# Patient Record
Sex: MALE | Race: WHITE | NOT HISPANIC OR LATINO | Employment: FULL TIME | URBAN - METROPOLITAN AREA
[De-identification: names, ages, dates, MRNs, and addresses within clinical notes are randomized per-mention and may not be internally consistent; named-entity substitution may affect disease eponyms.]

---

## 2018-05-17 ENCOUNTER — OFFICE VISIT (OUTPATIENT)
Dept: CARDIOLOGY | Facility: CLINIC | Age: 28
End: 2018-05-17
Payer: COMMERCIAL

## 2018-05-17 VITALS
WEIGHT: 173.5 LBS | HEART RATE: 70 BPM | SYSTOLIC BLOOD PRESSURE: 141 MMHG | BODY MASS INDEX: 25.7 KG/M2 | DIASTOLIC BLOOD PRESSURE: 95 MMHG | HEIGHT: 69 IN

## 2018-05-17 DIAGNOSIS — R00.2 HEART PALPITATIONS: ICD-10-CM

## 2018-05-17 DIAGNOSIS — R00.0 SINUS TACHYCARDIA BY ELECTROCARDIOGRAM: ICD-10-CM

## 2018-05-17 PROCEDURE — 99204 OFFICE O/P NEW MOD 45 MIN: CPT | Mod: S$GLB,,, | Performed by: INTERNAL MEDICINE

## 2018-05-17 PROCEDURE — 99999 PR PBB SHADOW E&M-EST. PATIENT-LVL II: CPT | Mod: PBBFAC,,, | Performed by: INTERNAL MEDICINE

## 2018-05-17 NOTE — PROGRESS NOTES
Subjective:    Patient ID:  Augustine Webb is a 28 y.o. male who presents for evaluation of new pt (new pt) and er follow up      HPI 29 yo WM who went to ER on 5- with palpitations and SOB. Work up negative except for sinus tachycardia. Had a few drinks that day but no drugs or energy drinks. 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  Review of Systems   Constitution: Negative for decreased appetite, fever, weakness, malaise/fatigue, weight gain and weight loss.   HENT: Negative for hearing loss and nosebleeds.    Eyes: Negative for visual disturbance.   Cardiovascular: Positive for irregular heartbeat and palpitations. Negative for chest pain, claudication, cyanosis, dyspnea on exertion, leg swelling, near-syncope, orthopnea, paroxysmal nocturnal dyspnea and syncope.   Respiratory: Negative for cough, hemoptysis, shortness of breath, sleep disturbances due to breathing, snoring and wheezing.    Endocrine: Negative for cold intolerance, heat intolerance, polydipsia and polyuria.   Hematologic/Lymphatic: Negative for adenopathy and bleeding problem. Does not bruise/bleed easily.   Skin: Negative for color change, itching, poor wound healing, rash and suspicious lesions.   Musculoskeletal: Negative for arthritis, back pain, falls, joint pain, joint swelling, muscle cramps, muscle weakness and myalgias.   Gastrointestinal: Negative for bloating, abdominal pain, change in bowel habit, constipation, flatus, heartburn, hematemesis, hematochezia, hemorrhoids, jaundice, melena, nausea and vomiting.   Genitourinary: Negative for bladder incontinence, decreased libido, frequency, hematuria, hesitancy and urgency.   Neurological: Negative for brief paralysis, difficulty with concentration, excessive daytime sleepiness, dizziness, focal weakness, headaches, light-headedness, loss of balance, numbness and vertigo.   Psychiatric/Behavioral: Negative for altered mental status, depression and  "memory loss. The patient does not have insomnia and is not nervous/anxious.    Allergic/Immunologic: Negative for environmental allergies, hives and persistent infections.        Objective:    Physical Exam   Constitutional: He is oriented to person, place, and time. He appears well-developed and well-nourished. No distress.   BP (!) 141/95   Pulse 70   Ht 5' 8.5" (1.74 m)   Wt 78.7 kg (173 lb 8 oz)   BMI 26.00 kg/m²      HENT:   Head: Normocephalic and atraumatic.   Eyes: Conjunctivae and lids are normal. Pupils are equal, round, and reactive to light. Right eye exhibits no discharge. No scleral icterus.   Neck: Normal range of motion. Neck supple. No JVD present. No tracheal deviation present. No thyromegaly present.   Cardiovascular: Normal rate, regular rhythm, S1 normal, S2 normal, normal heart sounds and intact distal pulses.  Exam reveals no gallop and no friction rub.    No murmur heard.  Pulses:       Carotid pulses are 2+ on the right side, and 2+ on the left side.       Radial pulses are 2+ on the right side, and 2+ on the left side.        Femoral pulses are 2+ on the right side, and 2+ on the left side.       Popliteal pulses are 2+ on the right side, and 2+ on the left side.        Dorsalis pedis pulses are 2+ on the right side, and 2+ on the left side.        Posterior tibial pulses are 2+ on the right side, and 2+ on the left side.   Pulmonary/Chest: Effort normal and breath sounds normal. No respiratory distress. He has no wheezes. He has no rales. He exhibits no tenderness.   Abdominal: Soft. Bowel sounds are normal. He exhibits no distension and no mass. There is no hepatosplenomegaly or hepatomegaly. There is no tenderness. There is no rebound and no guarding.   Musculoskeletal: Normal range of motion. He exhibits no edema or tenderness.   Lymphadenopathy:     He has no cervical adenopathy.   Neurological: He is alert and oriented to person, place, and time. He has normal reflexes. No cranial " nerve deficit. Coordination normal.   Skin: Skin is warm and dry. No rash noted. He is not diaphoretic. No erythema. No pallor.   Psychiatric: He has a normal mood and affect. His speech is normal and behavior is normal. Judgment and thought content normal. Cognition and memory are normal.         Assessment:       1. Heart palpitations    2. Sinus tachycardia by electrocardiogram         Plan:     Young healthy male with limited risk factors   Patient advised to limit exposure to caffeine, stimulants, and alcohol  Orders Placed This Encounter   Procedures    Holter monitor - 48 hour    2D echo with color flow doppler     Follow-up in about 3 months (around 8/17/2018).

## 2018-05-30 ENCOUNTER — CLINICAL SUPPORT (OUTPATIENT)
Dept: CARDIOLOGY | Facility: CLINIC | Age: 28
End: 2018-05-30
Attending: INTERNAL MEDICINE
Payer: COMMERCIAL

## 2018-05-30 ENCOUNTER — TELEPHONE (OUTPATIENT)
Dept: CARDIOLOGY | Facility: CLINIC | Age: 28
End: 2018-05-30

## 2018-05-30 VITALS
DIASTOLIC BLOOD PRESSURE: 70 MMHG | SYSTOLIC BLOOD PRESSURE: 116 MMHG | WEIGHT: 173 LBS | HEART RATE: 61 BPM | HEIGHT: 68 IN | BODY MASS INDEX: 26.22 KG/M2

## 2018-05-30 DIAGNOSIS — R00.0 SINUS TACHYCARDIA BY ELECTROCARDIOGRAM: ICD-10-CM

## 2018-05-30 DIAGNOSIS — R00.2 HEART PALPITATIONS: ICD-10-CM

## 2018-05-30 PROCEDURE — 99999 PR PBB SHADOW E&M-EST. PATIENT-LVL II: CPT | Mod: PBBFAC,,,

## 2018-05-30 PROCEDURE — 93224 XTRNL ECG REC UP TO 48 HRS: CPT | Mod: S$GLB,,, | Performed by: INTERNAL MEDICINE

## 2018-05-30 PROCEDURE — 93306 TTE W/DOPPLER COMPLETE: CPT | Mod: S$GLB,,, | Performed by: INTERNAL MEDICINE

## 2018-05-30 NOTE — TELEPHONE ENCOUNTER
----- Message from Stefano Valencia sent at 5/30/2018  2:57 PM CDT -----  Contact: Pt  Name of Who is Calling: Augustine      What is the request in detail: Pt is calling requesting clearance so he can return to work.      Can the clinic reply by [facility]: no      What Number to Call Back if not in [facility]: [phone] (home)

## 2018-05-31 NOTE — TELEPHONE ENCOUNTER
The work excuse is waiting to be picked up by registration desk in [facility].

## 2018-06-01 LAB
ASCENDING AORTA: 0.03 CM
AV MEAN GRADIENT: 2.82 MMHG
AV PEAK GRADIENT: 5.18 MMHG
AV VALVE AREA: 3.22 CM2
BSA FOR ECHO PROCEDURE: 1.94 M2
CV ECHO LV RWT: 0.44 CM
DOP CALC AO PEAK VEL: 1.14 M/S
DOP CALC AO VTI: 23.21 CM
DOP CALC LVOT AREA: 4.37 CM2
DOP CALC LVOT DIAMETER: 2.36 CM
DOP CALC LVOT STROKE VOLUME: 74.76 CM3
DOP CALCLVOT PEAK VEL VTI: 17.1 CM
E WAVE DECELERATION TIME: 235.55 MSEC
E/A RATIO: 1.05
E/E' RATIO: -7.65
ECHO EF ESTIMATED: 60 %
ECHO LV POSTERIOR WALL: 1 CM (ref 0.6–1.1)
FRACTIONAL SHORTENING: 34 % (ref 28–44)
INTERVENTRICULAR SEPTUM: 1.06 CM (ref 0.6–1.1)
IVRT: 0.1 MSEC
LA MAJOR: 4.85 CM
LA MINOR: 4.76 CM
LA WIDTH: 3.26 CM
LEFT ATRIUM SIZE: 3.55 CM
LEFT ATRIUM VOLUME INDEX: 24.4 ML/M2
LEFT ATRIUM VOLUME: 47.26 CM3
LEFT INTERNAL DIMENSION IN SYSTOLE: 3.12 CM (ref 2.1–4)
LEFT VENTRICLE MASS INDEX: 88.9 G/M2
LEFT VENTRICULAR INTERNAL DIMENSION IN DIASTOLE: 4.72 CM (ref 3.5–6)
LEFT VENTRICULAR MASS: 172.42 G
LV LATERAL E/E' RATIO: -7.22
LV SEPTAL E/E' RATIO: -8.13
MV PEAK A VEL: 0.62 M/S
MV PEAK E VEL: 0.65 M/S
MV STENOSIS PRESSURE HALF TIME: 68.31 MS
MV VALVE AREA P 1/2 METHOD: 3.22 CM2
PISA TR MAX VEL: 2.47 M/S
PULM VEIN S/D RATIO: 0.83
PV PEAK D VEL: 0.59 M/S
PV PEAK S VEL: 0.49 M/S
RA MAJOR: 4.5 CM
RA PRESSURE: 3 MMHG
RA WIDTH: 3.59 CM
RIGHT VENTRICULAR END-DIASTOLIC DIMENSION: 4.35 CM
SINUS: 3.22 CM
STJ: 2.96 CM
TDI LATERAL: -0.09
TDI SEPTAL: -0.08
TDI: -0.09
TR MAX PG: 24.38 MMHG
TRICUSPID ANNULAR PLANE SYSTOLIC EXCURSION: 0.02 CM
TV REST PULMONARY ARTERY PRESSURE: 27.38 MMHG

## 2018-06-07 ENCOUNTER — TELEPHONE (OUTPATIENT)
Dept: CARDIOLOGY | Facility: CLINIC | Age: 28
End: 2018-06-07

## 2018-06-08 ENCOUNTER — TELEPHONE (OUTPATIENT)
Dept: CARDIOLOGY | Facility: CLINIC | Age: 28
End: 2018-06-08

## 2018-06-08 NOTE — TELEPHONE ENCOUNTER
Will get Dr Flores to write an excuse stating all test were clear.       ----- Message from Latanya March sent at 6/8/2018  9:56 AM CDT -----  Contact: wife Татьяна Webb  Wife states patient needs his return to work excuse to state his test were all clear in addition to him coming back to work.    Please call [phone]    Please call when ready for patient to

## 2018-06-11 ENCOUNTER — TELEPHONE (OUTPATIENT)
Dept: CARDIOLOGY | Facility: CLINIC | Age: 28
End: 2018-06-11

## 2018-06-11 NOTE — TELEPHONE ENCOUNTER
----- Message from Nichole Paez MA sent at 6/11/2018 11:06 AM CDT -----  Contact: same  Pt would like his test results. States he can not return to work until he his cleared.   ----- Message -----  From: Valerio Whitten  Sent: 6/11/2018  10:50 AM  To: Sandra OVERTON  Staff    Patient called in and stated he needs to get his test results ( ECHO & Holter Monitor) that was done on 5/30/18.  Patient dropped off monitor on 6/1/18.  Patient stated he cannot return to work until he gets results along with work release.    Patient call back number is [phone]

## 2018-06-11 NOTE — TELEPHONE ENCOUNTER
Called pt and left VM, work release is ready for pickup, all test normal.     ----- Message from Valerio Whitten sent at 6/11/2018 10:50 AM CDT -----  Contact: same  Patient called in and stated he needs to get his test results ( ECHO & Holter Monitor) that was done on 5/30/18.  Patient dropped off monitor on 6/1/18.  Patient stated he cannot return to work until he gets results along with work release.    Patient call back number is [phone]

## 2018-07-24 PROBLEM — F41.0 GENERALIZED ANXIETY DISORDER WITH PANIC ATTACKS: Status: ACTIVE | Noted: 2018-07-24

## 2018-07-24 PROBLEM — F41.1 GENERALIZED ANXIETY DISORDER WITH PANIC ATTACKS: Status: ACTIVE | Noted: 2018-07-24

## 2018-12-06 ENCOUNTER — OFFICE VISIT (OUTPATIENT)
Dept: URGENT CARE | Facility: CLINIC | Age: 28
End: 2018-12-06
Payer: COMMERCIAL

## 2018-12-06 VITALS
SYSTOLIC BLOOD PRESSURE: 130 MMHG | BODY MASS INDEX: 26.52 KG/M2 | TEMPERATURE: 98 F | HEART RATE: 80 BPM | HEIGHT: 68 IN | DIASTOLIC BLOOD PRESSURE: 86 MMHG | RESPIRATION RATE: 18 BRPM | OXYGEN SATURATION: 98 % | WEIGHT: 175 LBS

## 2018-12-06 DIAGNOSIS — M71.30 SYNOVIAL CYST: Primary | ICD-10-CM

## 2018-12-06 PROCEDURE — 99213 OFFICE O/P EST LOW 20 MIN: CPT | Mod: S$GLB,,, | Performed by: FAMILY MEDICINE

## 2018-12-06 NOTE — PROGRESS NOTES
"Subjective:       Patient ID: Augustine Webb is a 28 y.o. male.    Vitals:  height is 5' 8" (1.727 m) and weight is 79.4 kg (175 lb). His oral temperature is 97.7 °F (36.5 °C). His blood pressure is 130/86 and his pulse is 80. His respiration is 18 and oxygen saturation is 98%.     Chief Complaint: Abscess    Left thumb swollen for months      Abscess   Chronicity:  NewProgression Since Onset: gradually worsening  Location:  Finger  Associated Symptoms: no fever, no chills  Characteristics: redness, bruising and blistering    Treatments Tried:  Nothing  Relieved by:  Nothing  Worsened by:  Nothing   Patient has abstained a left thumb injury several months ago few months after he developed this cystic lesion at the radial surface dorsally along his left thumb at the IP joint.  It is painless.  He was hoping to have it removed today.    Constitution: Negative for chills, fatigue and fever.   HENT: Negative for congestion and sore throat.    Neck: Negative for painful lymph nodes.   Cardiovascular: Negative for chest pain and leg swelling.   Eyes: Negative for double vision and blurred vision.   Respiratory: Negative for cough and shortness of breath.    Gastrointestinal: Negative for nausea, vomiting and diarrhea.   Genitourinary: Negative for dysuria, frequency and urgency.   Musculoskeletal: Negative for joint pain, joint swelling, muscle cramps and muscle ache.   Skin: Positive for abscess. Negative for color change, pale and rash.   Allergic/Immunologic: Negative for seasonal allergies.   Neurological: Negative for dizziness, history of vertigo, light-headedness, passing out and headaches.   Hematologic/Lymphatic: Negative for swollen lymph nodes, easy bruising/bleeding and history of blood clots. Does not bruise/bleed easily.   Psychiatric/Behavioral: Negative for nervous/anxious, sleep disturbance and depression. The patient is not nervous/anxious.        Objective:      Physical Exam   Constitutional: He appears " well-developed and well-nourished.   HENT:   Head: Normocephalic.   Eyes: EOM are normal. Pupils are equal, round, and reactive to light. Right eye exhibits discharge. Left eye exhibits discharge.   Neck: Normal range of motion.   Musculoskeletal:   1.5 cm synovial cyst present on the dorsum of the left thumb along the radial aspect.  No secondary infection no tenderness no fluctuance.   Nursing note and vitals reviewed.      Assessment:       1. Synovial cyst        Plan:         Synovial cyst  -     Ambulatory referral to Orthopedics     Patient would like to consider having the lesion removed because it interferes with the use of his left hand we will refer him to Orthopedics to discuss options for management.

## 2018-12-06 NOTE — PATIENT INSTRUCTIONS
Treating Ganglia    A ganglion is a swelling (cyst) that forms on joint and tendon sheaths. They are most often found in the wrist. But they can also show up on the foot, fingers, or toes. Ganglia are believed to be caused by a rupture of the tissue that lines the joints and tendon sheaths (synovial tissue).   Ganglia are often difficult to treat without surgery--but nonsurgical methods may help relieve some of your symptoms.  Nonsurgical care  Nonsurgical methods include:   · Pads placed around the ganglion can ease pressure and friction.  · Removing the fluid may also relieve symptoms, though ganglia may come back. The gelatinous gel is removed through a large-bore needle. You may get a steroid injection after the cyst fluid is removed.  · Limiting movements or activities that increase pain may bring relief.  · Icing the ganglion for 15 to 20 minutes may temporarily relieve inflammation and pain.  · If your inflammation is severe, your healthcare provider may treat your symptoms with medicine.  Surgery  If a ganglion is causing ongoing or severe pain, your healthcare provider may recommend surgery. Your surgeon removes the entire ganglion wall during the procedure. He or she may also remove some surrounding tissue. If the ganglion has come through a tear in the capsule of the joint, the tear will have to be repaired. The joint will likely be get still while the tear heals.  After surgery  You may feel pain, swelling, numbness, or tingling for several weeks after surgery. Be sure to see your healthcare provider if you notice any problems in the future. Although surgery is usually successful, there is a chance that the ganglion will come back.  Date Last Reviewed: 9/27/2015  © 7560-8092 [employer]. [address] [place] [address], [place] [address]. All rights reserved. This information is not intended as a substitute for professional medical care. Always follow your healthcare professional's  instructions.        Ganglion Cyst: Hand    A ganglion cyst is a firm, fluid-filled lump that can suddenly appear on the front or back of the wrist or at the base of a finger. These cysts grow from normal tissue in the wrist and fingers, and range in size from a pea to a peach pit. Although ganglion cysts are common, they dont spread, and they dont become cancerous. They can occur after an injury, but many times it isnt known why they grow. Ganglion cysts can change in size, and may go away on their own.  Symptoms  A ganglion cyst is sometimes painful, especially when it first occurs. Constantly using your hand or wrist can make the cyst enlarge and hurt more. Some hand and wrist movements, such as grasping things, may also be difficult.  How a ganglion cyst develops  Your wrist and hand are made up of many small bones that meet at joints. Tendons attach muscles to the bones at the joints. The tendons allow the joints to bend and straighten. Both tendons and joints are lined with tissue called synovium. This tissue makes a thick fluid that keeps the joints and tendons moving easily. Sometimes the tissue balloons out from the joint or tendons and forms a cyst. As the cyst fills with fluid and grows, it appears as a lump you can feel.  Where ganglion cysts occur  A ganglion cyst can occur anywhere on the hand near a joint. Cysts most commonly appear on the back or palm side of the wrist, or on the palm at the base of a finger. Your doctor can usually diagnose a cyst by examining the lump. He or she may draw off a little fluid or order an X-ray to rule out other problems.  Treating a ganglion cyst  Your healthcare provider may just watch your ganglion cyst. Many shrink and become painless without treatment. Some disappear altogether. If the cyst is unsightly or painful, or makes it hard for you to use your hand, your healthcare provider can treat it or, if needed, remove it surgically.  Nonsurgical treatment  To  shrink the cyst, your provider may remove (aspirate) the fluid with a needle. If the cyst hurts, your provider may also give you an injection of an anti-inflammatory, such as cortisone, to relieve the irritation. Your hand may then be wrapped to help keep the cyst from recurring.  Surgery  If the cyst reappears after treatment, your healthcare provider may remove it surgically. A section of the tissue that lines the joint or tendon is removed along with the cyst. This helps prevent another cyst from forming, although recurrence of the cyst is still possible after surgery. Usually, only your hand or arm is numbed, and you can go home a few hours after surgery. Your hand may be in a splint for several days.  Date Last Reviewed: 9/10/2015  © 8492-5350 [employer]. [address] [place] [address], [place] [address]. All rights reserved. This information is not intended as a substitute for professional medical care. Always follow your healthcare professional's instructions.

## 2018-12-11 DIAGNOSIS — M71.332 SYNOVIAL CYST OF WRIST, LEFT: Primary | ICD-10-CM

## 2018-12-12 ENCOUNTER — OFFICE VISIT (OUTPATIENT)
Dept: ORTHOPEDICS | Facility: CLINIC | Age: 28
End: 2018-12-12
Payer: COMMERCIAL

## 2018-12-12 ENCOUNTER — HOSPITAL ENCOUNTER (OUTPATIENT)
Dept: RADIOLOGY | Facility: HOSPITAL | Age: 28
Discharge: HOME | End: 2018-12-12
Attending: ORTHOPAEDIC SURGERY
Payer: COMMERCIAL

## 2018-12-12 VITALS — HEIGHT: 68 IN | WEIGHT: 175 LBS | BODY MASS INDEX: 26.52 KG/M2

## 2018-12-12 DIAGNOSIS — M71.332 SYNOVIAL CYST OF WRIST, LEFT: ICD-10-CM

## 2018-12-12 DIAGNOSIS — R22.32 MASS OF LEFT FINGER: Primary | ICD-10-CM

## 2018-12-12 DIAGNOSIS — R22.32 MASS OF FINGER OF LEFT HAND: ICD-10-CM

## 2018-12-12 PROCEDURE — 73130 X-RAY EXAM OF HAND: CPT | Mod: TC,PO,LT

## 2018-12-12 PROCEDURE — 99204 OFFICE O/P NEW MOD 45 MIN: CPT | Mod: 57,S$GLB,, | Performed by: ORTHOPAEDIC SURGERY

## 2018-12-12 PROCEDURE — 73130 X-RAY EXAM OF HAND: CPT | Mod: 26,LT,, | Performed by: RADIOLOGY

## 2018-12-12 PROCEDURE — 73110 X-RAY EXAM OF WRIST: CPT | Mod: 26,LT,, | Performed by: RADIOLOGY

## 2018-12-12 PROCEDURE — 99999 PR PBB SHADOW E&M-EST. PATIENT-LVL II: CPT | Mod: PBBFAC,,, | Performed by: ORTHOPAEDIC SURGERY

## 2018-12-12 PROCEDURE — 73110 X-RAY EXAM OF WRIST: CPT | Mod: TC,PO,LT

## 2018-12-12 RX ORDER — SODIUM CHLORIDE 9 MG/ML
INJECTION, SOLUTION INTRAVENOUS CONTINUOUS
Status: CANCELLED | OUTPATIENT
Start: 2018-12-12

## 2018-12-12 NOTE — PROGRESS NOTES
HISTORY OF PRESENT ILLNESS:  28 years old, complaining of mass on his left thumb   dorsally at the IP joint ____ he cut his thumb.  It got progressively larger as   quite large.  He wants to have it removed.   Pain is 1/10 on good days, 5/10 on   bad days.  Bending ____ bothersome.    PHYSICAL EXAMINATION:  No instability.  No signs of infection.  Large soft   tissue mass at the level of IP joint.    X-rays did show the soft tissue mass as well.    ASSESSMENT:  Soft tissue mass, left thumb, probable inclusion cyst.    PLAN:  We are going to get him set up for removal of mass.  He is aware of the   risks and limitations and still wants to proceed.  We look forward to seeing him   at the time of surgery.      PBB/HN  dd: 12/12/2018 08:44:55 (CST)  td: 12/12/2018 12:44:13 (CST)  Doc ID   #[id number]  Job ID #[id number]    CC:     Further History  Aching pain  Worse with activity  Relieved with rest  No other associated symptoms  No other radiation    Further Exam  Alert and oriented  Pleasant  Contralateral limb has appropriate range of motion for age and condition  Contralateral limb has appropriate strength for age and condition  Contralateral limb has appropriate stability  for age and condition  No adenopathy  Pulses are appropriate for current condition  Skin is intact        Chief Complaint    Chief Complaint   Patient presents with    Left Hand - Pain    Cyst     left thumb        HPI  Augustine Webb is a 28 y.o.  male who presents with       Past Medical History  History reviewed. No pertinent past medical history.    Past Surgical History  Past Surgical History:   Procedure Laterality Date    HERNIA REPAIR         Medications  Current Outpatient Medications   Medication Sig    sertraline (ZOLOFT) 50 MG tablet Take 1 tablet (50 mg total) by mouth once daily.    LORazepam (ATIVAN) 0.5 MG tablet Take 1 tablet (0.5 mg total) by mouth daily as needed for Anxiety.     No current facility-administered medications for  this visit.        Allergies  Review of patient's allergies indicates:  No Known Allergies    Family History  Family History   Problem Relation Age of Onset    No Known Problems Mother     No Known Problems Father        Social History  Social History     Socioeconomic History    Marital status:      Spouse name: Not on file    Number of children: Not on file    Years of education: Not on file    Highest education level: Not on file   Social Needs    Financial resource strain: Not on file    Food insecurity - worry: Not on file    Food insecurity - inability: Not on file    Transportation needs - medical: Not on file    Transportation needs - non-medical: Not on file   Occupational History    Not on file   Tobacco Use    Smoking status: Current Every Day Smoker     Packs/day: 0.25     Types: Vaping with nicotine   Substance and Sexual Activity    Alcohol use: Yes     Comment: occassional    Drug use: No    Sexual activity: Not on file   Other Topics Concern    Not on file   Social History Narrative    Not on file               Review of Systems     Constitutional: Negative    HENT: Negative  Eyes: Negative  Respiratory: Negative  Cardiovascular: Negative  Musculoskeletal: HPI  Skin: Negative  Neurological: Negative  Hematological: Negative  Endocrine: Negative                 Physical Exam    There were no vitals filed for this visit.  Body mass index is 26.61 kg/m².  Physical Examination:     General appearance -  well appearing, and in no distress  Mental status - awake  Neck - supple  Chest -  symmetric air entry  Heart - normal rate   Abdomen - soft      Assessment     1. Mass of left finger          Plan

## 2018-12-12 NOTE — LETTER
December 12, 2018      Camilo Oro MD  [address]  [facility] [address]           [facility]  [address] [facility] [address] [place] [address]  Phone: [phone]          Patient: Augustine Webb   MR Number: [id number]   YOB: 1990   Date of Visit: 12/12/2018       Dear Dr. Camilo Oro:    Thank you for referring Augustine Webb to me for evaluation. Attached you will find relevant portions of my assessment and plan of care.    If you have questions, please do not hesitate to call me. I look forward to following Augustine Webb along with you.    Sincerely,    Mk Escamilla MD    Enclosure  CC:  No Recipients    If you would like to receive this communication electronically, please contact [email or web address] or [phone] to request more information on [employer] [place] Link access.    For providers and/or their staff who would like to refer a patient to [facility], please contact us through our one-stop-shop provider referral line, [facility] , at [phone].    If you feel you have received this communication in error or would no longer like to receive these types of communications, please e-mail [email or web address]

## 2018-12-12 NOTE — H&P (VIEW-ONLY)
HISTORY OF PRESENT ILLNESS:  28 years old, complaining of mass on his left thumb   dorsally at the IP joint ____ he cut his thumb.  It got progressively larger as   quite large.  He wants to have it removed.   Pain is 1/10 on good days, 5/10 on   bad days.  Bending ____ bothersome.    PHYSICAL EXAMINATION:  No instability.  No signs of infection.  Large soft   tissue mass at the level of IP joint.    X-rays did show the soft tissue mass as well.    ASSESSMENT:  Soft tissue mass, left thumb, probable inclusion cyst.    PLAN:  We are going to get him set up for removal of mass.  He is aware of the   risks and limitations and still wants to proceed.  We look forward to seeing him   at the time of surgery.      PBB/HN  dd: 12/12/2018 08:44:55 (CST)  td: 12/12/2018 12:44:13 (CST)  Doc ID   #[id number]  Job ID #[id number]    CC:     Further History  Aching pain  Worse with activity  Relieved with rest  No other associated symptoms  No other radiation    Further Exam  Alert and oriented  Pleasant  Contralateral limb has appropriate range of motion for age and condition  Contralateral limb has appropriate strength for age and condition  Contralateral limb has appropriate stability  for age and condition  No adenopathy  Pulses are appropriate for current condition  Skin is intact        Chief Complaint    Chief Complaint   Patient presents with    Left Hand - Pain    Cyst     left thumb        HPI  Augustine Webb is a 28 y.o.  male who presents with       Past Medical History  History reviewed. No pertinent past medical history.    Past Surgical History  Past Surgical History:   Procedure Laterality Date    HERNIA REPAIR         Medications  Current Outpatient Medications   Medication Sig    sertraline (ZOLOFT) 50 MG tablet Take 1 tablet (50 mg total) by mouth once daily.    LORazepam (ATIVAN) 0.5 MG tablet Take 1 tablet (0.5 mg total) by mouth daily as needed for Anxiety.     No current facility-administered medications for  this visit.        Allergies  Review of patient's allergies indicates:  No Known Allergies    Family History  Family History   Problem Relation Age of Onset    No Known Problems Mother     No Known Problems Father        Social History  Social History     Socioeconomic History    Marital status:      Spouse name: Not on file    Number of children: Not on file    Years of education: Not on file    Highest education level: Not on file   Social Needs    Financial resource strain: Not on file    Food insecurity - worry: Not on file    Food insecurity - inability: Not on file    Transportation needs - medical: Not on file    Transportation needs - non-medical: Not on file   Occupational History    Not on file   Tobacco Use    Smoking status: Current Every Day Smoker     Packs/day: 0.25     Types: Vaping with nicotine   Substance and Sexual Activity    Alcohol use: Yes     Comment: occassional    Drug use: No    Sexual activity: Not on file   Other Topics Concern    Not on file   Social History Narrative    Not on file               Review of Systems     Constitutional: Negative    HENT: Negative  Eyes: Negative  Respiratory: Negative  Cardiovascular: Negative  Musculoskeletal: HPI  Skin: Negative  Neurological: Negative  Hematological: Negative  Endocrine: Negative                 Physical Exam    There were no vitals filed for this visit.  Body mass index is 26.61 kg/m².  Physical Examination:     General appearance -  well appearing, and in no distress  Mental status - awake  Neck - supple  Chest -  symmetric air entry  Heart - normal rate   Abdomen - soft      Assessment     1. Mass of left finger          Plan

## 2018-12-18 DIAGNOSIS — G89.18 POST-OP PAIN: Primary | ICD-10-CM

## 2018-12-18 NOTE — TELEPHONE ENCOUNTER
Called patient in regards to Ativan prescription. Patient stated he has Ativan and takes PRN. Instructed patient not to take Ativan while taking pain medication prescribed after surgery on 12/20/18 due to side effects. Patient verbalized understanding and stated he will not take the Ativan while taking pain medication.

## 2018-12-19 ENCOUNTER — TELEPHONE (OUTPATIENT)
Dept: ORTHOPEDICS | Facility: CLINIC | Age: 28
End: 2018-12-19

## 2018-12-19 ENCOUNTER — ANESTHESIA EVENT (OUTPATIENT)
Dept: SURGERY | Facility: HOSPITAL | Age: 28
End: 2018-12-19
Payer: COMMERCIAL

## 2018-12-19 RX ORDER — OXYCODONE AND ACETAMINOPHEN 5; 325 MG/1; MG/1
1 TABLET ORAL
Qty: 32 TABLET | Refills: 0 | Status: SHIPPED | OUTPATIENT
Start: 2018-12-19 | End: 2020-08-12

## 2018-12-19 NOTE — TELEPHONE ENCOUNTER
Spoke to patients wife, Татьяна, she stated she would like patient to reschedule tomorrows surgery to have done at [facility]. Informed patient  has cases before and after already scheduled and not able to go to two different facilities. Patient wife verbalized understanding an stated to keep surgery scheduled for now, she will speak with finance at [facility] to see how much of a cost difference it would cost and call back today in regards to if patient will be having surgery tomorrow or not.      ----- Message from Valerio Whitten sent at 12/19/2018 10:40 AM CST -----  Contact: Wife/Татьяна  Татьяна called in regarding the attached patient (). Татьяна stated patient is scheduled for surgery at [facility] on 12/20/18 but Татьяна stated she works at [facility] and her insurance informed her they would cover more of the cost of the procedure if physician did it at [facility]?    Татьяна wanted to see if that was possible?  Татьяна call back number is [phone] work & ask for Татьяна

## 2018-12-20 ENCOUNTER — ANESTHESIA (OUTPATIENT)
Dept: SURGERY | Facility: HOSPITAL | Age: 28
End: 2018-12-20
Payer: COMMERCIAL

## 2018-12-20 ENCOUNTER — HOSPITAL ENCOUNTER (OUTPATIENT)
Facility: HOSPITAL | Age: 28
Discharge: HOME | End: 2018-12-20
Attending: ORTHOPAEDIC SURGERY | Admitting: ORTHOPAEDIC SURGERY
Payer: COMMERCIAL

## 2018-12-20 DIAGNOSIS — R22.32 MASS OF FINGER OF LEFT HAND: ICD-10-CM

## 2018-12-20 DIAGNOSIS — R22.32 MASS OF LEFT FINGER: ICD-10-CM

## 2018-12-20 LAB
ACID FAST MOD KINY STN SPEC: NORMAL
GRAM STN SPEC: NORMAL
GRAM STN SPEC: NORMAL

## 2018-12-20 PROCEDURE — 63600175 PHARM REV CODE 636 W HCPCS: Mod: PO | Performed by: NURSE ANESTHETIST, CERTIFIED REGISTERED

## 2018-12-20 PROCEDURE — 87176 TISSUE HOMOGENIZATION CULTR: CPT

## 2018-12-20 PROCEDURE — 27200651 HC AIRWAY, LMA: Mod: PO | Performed by: NURSE ANESTHETIST, CERTIFIED REGISTERED

## 2018-12-20 PROCEDURE — D9220A PRA ANESTHESIA: Mod: ANES,,, | Performed by: ANESTHESIOLOGY

## 2018-12-20 PROCEDURE — 88304 TISSUE EXAM BY PATHOLOGIST: CPT | Mod: 26,,, | Performed by: PATHOLOGY

## 2018-12-20 PROCEDURE — 25000003 PHARM REV CODE 250: Mod: PO | Performed by: ORTHOPAEDIC SURGERY

## 2018-12-20 PROCEDURE — 71000033 HC RECOVERY, INTIAL HOUR: Mod: PO | Performed by: ORTHOPAEDIC SURGERY

## 2018-12-20 PROCEDURE — 63600175 PHARM REV CODE 636 W HCPCS: Mod: PO | Performed by: ANESTHESIOLOGY

## 2018-12-20 PROCEDURE — 87075 CULTR BACTERIA EXCEPT BLOOD: CPT

## 2018-12-20 PROCEDURE — 87070 CULTURE OTHR SPECIMN AEROBIC: CPT

## 2018-12-20 PROCEDURE — 26160 REMOVE TENDON SHEATH LESION: CPT | Mod: FA,,, | Performed by: ORTHOPAEDIC SURGERY

## 2018-12-20 PROCEDURE — 71000015 HC POSTOP RECOV 1ST HR: Mod: PO | Performed by: ORTHOPAEDIC SURGERY

## 2018-12-20 PROCEDURE — D9220A PRA ANESTHESIA: Mod: CRNA,,, | Performed by: NURSE ANESTHETIST, CERTIFIED REGISTERED

## 2018-12-20 PROCEDURE — 36000706: Mod: PO | Performed by: ORTHOPAEDIC SURGERY

## 2018-12-20 PROCEDURE — 87206 SMEAR FLUORESCENT/ACID STAI: CPT

## 2018-12-20 PROCEDURE — 37000008 HC ANESTHESIA 1ST 15 MINUTES: Mod: PO | Performed by: ORTHOPAEDIC SURGERY

## 2018-12-20 PROCEDURE — 88304 TISSUE SPECIMEN TO PATHOLOGY - SURGERY: ICD-10-PCS | Mod: 26,,, | Performed by: PATHOLOGY

## 2018-12-20 PROCEDURE — 25000003 PHARM REV CODE 250: Mod: PO | Performed by: NURSE ANESTHETIST, CERTIFIED REGISTERED

## 2018-12-20 PROCEDURE — 26160 PR EXCIS TENDON SHEATH LESION, HAND/FINGER: ICD-10-PCS | Mod: FA,,, | Performed by: ORTHOPAEDIC SURGERY

## 2018-12-20 PROCEDURE — 25000003 PHARM REV CODE 250: Mod: PO | Performed by: ANESTHESIOLOGY

## 2018-12-20 PROCEDURE — 63600175 PHARM REV CODE 636 W HCPCS: Mod: PO | Performed by: ORTHOPAEDIC SURGERY

## 2018-12-20 PROCEDURE — 87076 CULTURE ANAEROBE IDENT EACH: CPT

## 2018-12-20 PROCEDURE — 71000016 HC POSTOP RECOV ADDL HR: Mod: PO | Performed by: ORTHOPAEDIC SURGERY

## 2018-12-20 PROCEDURE — 88304 TISSUE EXAM BY PATHOLOGIST: CPT | Performed by: PATHOLOGY

## 2018-12-20 PROCEDURE — 87205 SMEAR GRAM STAIN: CPT

## 2018-12-20 PROCEDURE — 87102 FUNGUS ISOLATION CULTURE: CPT

## 2018-12-20 PROCEDURE — 36000707: Mod: PO | Performed by: ORTHOPAEDIC SURGERY

## 2018-12-20 PROCEDURE — 37000009 HC ANESTHESIA EA ADD 15 MINS: Mod: PO | Performed by: ORTHOPAEDIC SURGERY

## 2018-12-20 RX ORDER — SODIUM CHLORIDE 0.9 % (FLUSH) 0.9 %
3 SYRINGE (ML) INJECTION
Status: DISCONTINUED | OUTPATIENT
Start: 2018-12-20 | End: 2018-12-20 | Stop reason: HOSPADM

## 2018-12-20 RX ORDER — DEXAMETHASONE SODIUM PHOSPHATE 4 MG/ML
8 INJECTION, SOLUTION INTRA-ARTICULAR; INTRALESIONAL; INTRAMUSCULAR; INTRAVENOUS; SOFT TISSUE
Status: COMPLETED | OUTPATIENT
Start: 2018-12-20 | End: 2018-12-20

## 2018-12-20 RX ORDER — MEPERIDINE HYDROCHLORIDE 50 MG/ML
12.5 INJECTION INTRAMUSCULAR; INTRAVENOUS; SUBCUTANEOUS ONCE AS NEEDED
Status: DISCONTINUED | OUTPATIENT
Start: 2018-12-20 | End: 2018-12-20 | Stop reason: HOSPADM

## 2018-12-20 RX ORDER — MIDAZOLAM HYDROCHLORIDE 1 MG/ML
INJECTION, SOLUTION INTRAMUSCULAR; INTRAVENOUS
Status: DISCONTINUED | OUTPATIENT
Start: 2018-12-20 | End: 2018-12-20

## 2018-12-20 RX ORDER — OXYCODONE HYDROCHLORIDE 5 MG/1
5 TABLET ORAL
Status: DISCONTINUED | OUTPATIENT
Start: 2018-12-20 | End: 2018-12-20 | Stop reason: HOSPADM

## 2018-12-20 RX ORDER — LIDOCAINE HYDROCHLORIDE 10 MG/ML
1 INJECTION, SOLUTION EPIDURAL; INFILTRATION; INTRACAUDAL; PERINEURAL ONCE
Status: DISCONTINUED | OUTPATIENT
Start: 2018-12-20 | End: 2020-08-12

## 2018-12-20 RX ORDER — SODIUM CHLORIDE 9 MG/ML
INJECTION, SOLUTION INTRAVENOUS CONTINUOUS
Status: DISCONTINUED | OUTPATIENT
Start: 2018-12-20 | End: 2018-12-20

## 2018-12-20 RX ORDER — FENTANYL CITRATE 50 UG/ML
25 INJECTION, SOLUTION INTRAMUSCULAR; INTRAVENOUS EVERY 5 MIN PRN
Status: COMPLETED | OUTPATIENT
Start: 2018-12-20 | End: 2018-12-20

## 2018-12-20 RX ORDER — DIPHENHYDRAMINE HCL 25 MG
25 CAPSULE ORAL EVERY 6 HOURS PRN
Status: DISCONTINUED | OUTPATIENT
Start: 2018-12-20 | End: 2018-12-20 | Stop reason: HOSPADM

## 2018-12-20 RX ORDER — CEFAZOLIN SODIUM 2 G/50ML
2 SOLUTION INTRAVENOUS
Status: COMPLETED | OUTPATIENT
Start: 2018-12-20 | End: 2018-12-20

## 2018-12-20 RX ORDER — LIDOCAINE HCL/PF 100 MG/5ML
SYRINGE (ML) INTRAVENOUS
Status: DISCONTINUED | OUTPATIENT
Start: 2018-12-20 | End: 2018-12-20

## 2018-12-20 RX ORDER — SODIUM CHLORIDE, SODIUM LACTATE, POTASSIUM CHLORIDE, CALCIUM CHLORIDE 600; 310; 30; 20 MG/100ML; MG/100ML; MG/100ML; MG/100ML
INJECTION, SOLUTION INTRAVENOUS CONTINUOUS
Status: DISCONTINUED | OUTPATIENT
Start: 2018-12-20 | End: 2020-08-12

## 2018-12-20 RX ORDER — PROPOFOL 10 MG/ML
VIAL (ML) INTRAVENOUS
Status: DISCONTINUED | OUTPATIENT
Start: 2018-12-20 | End: 2018-12-20

## 2018-12-20 RX ORDER — BUPIVACAINE HYDROCHLORIDE 2.5 MG/ML
INJECTION, SOLUTION EPIDURAL; INFILTRATION; INTRACAUDAL
Status: DISCONTINUED | OUTPATIENT
Start: 2018-12-20 | End: 2018-12-20 | Stop reason: HOSPADM

## 2018-12-20 RX ORDER — HYDROMORPHONE HYDROCHLORIDE 2 MG/ML
0.2 INJECTION, SOLUTION INTRAMUSCULAR; INTRAVENOUS; SUBCUTANEOUS EVERY 5 MIN PRN
Status: DISCONTINUED | OUTPATIENT
Start: 2018-12-20 | End: 2018-12-20 | Stop reason: HOSPADM

## 2018-12-20 RX ORDER — FENTANYL CITRATE 50 UG/ML
INJECTION, SOLUTION INTRAMUSCULAR; INTRAVENOUS
Status: DISCONTINUED | OUTPATIENT
Start: 2018-12-20 | End: 2018-12-20

## 2018-12-20 RX ORDER — ACETAMINOPHEN 10 MG/ML
INJECTION, SOLUTION INTRAVENOUS
Status: DISCONTINUED | OUTPATIENT
Start: 2018-12-20 | End: 2018-12-20

## 2018-12-20 RX ADMIN — DEXAMETHASONE SODIUM PHOSPHATE 8 MG: 4 INJECTION, SOLUTION INTRAMUSCULAR; INTRAVENOUS at 07:12

## 2018-12-20 RX ADMIN — PROPOFOL 200 MG: 10 INJECTION, EMULSION INTRAVENOUS at 08:12

## 2018-12-20 RX ADMIN — MIDAZOLAM HYDROCHLORIDE 2 MG: 1 INJECTION, SOLUTION INTRAMUSCULAR; INTRAVENOUS at 08:12

## 2018-12-20 RX ADMIN — SODIUM CHLORIDE, SODIUM LACTATE, POTASSIUM CHLORIDE, AND CALCIUM CHLORIDE: .6; .31; .03; .02 INJECTION, SOLUTION INTRAVENOUS at 07:12

## 2018-12-20 RX ADMIN — KETAMINE HYDROCHLORIDE 25 MG: 100 INJECTION, SOLUTION, CONCENTRATE INTRAMUSCULAR; INTRAVENOUS at 08:12

## 2018-12-20 RX ADMIN — FENTANYL CITRATE 25 MCG: 50 INJECTION INTRAMUSCULAR; INTRAVENOUS at 09:12

## 2018-12-20 RX ADMIN — LIDOCAINE HYDROCHLORIDE 100 MG: 20 INJECTION PARENTERAL at 08:12

## 2018-12-20 RX ADMIN — OXYCODONE HYDROCHLORIDE 5 MG: 5 TABLET ORAL at 09:12

## 2018-12-20 RX ADMIN — CEFAZOLIN SODIUM 2 G: 2 SOLUTION INTRAVENOUS at 08:12

## 2018-12-20 RX ADMIN — ACETAMINOPHEN 1000 MG: 10 INJECTION, SOLUTION INTRAVENOUS at 08:12

## 2018-12-20 RX ADMIN — FENTANYL CITRATE 100 MCG: 50 INJECTION, SOLUTION INTRAMUSCULAR; INTRAVENOUS at 08:12

## 2018-12-20 NOTE — DISCHARGE SUMMARY
Discharge Note  Short Stay      SUMMARY     Admit Date: 12/20/2018    Attending Physician: Mk Escamilla MD     Discharge Physician: Mk Escamilla MD    Discharge Date: 12/20/2018 8:51 AM    Final Diagnosis: Mass of left finger [R22.32]    Hospital Course: Patient tolerated procedure well.     Disposition: Home or Self Care    Patient Instructions:   Current Discharge Medication List      CONTINUE these medications which have NOT CHANGED    Details   LORazepam (ATIVAN) 0.5 MG tablet Take 1 tablet (0.5 mg total) by mouth daily as needed for Anxiety.  Qty: 30 tablet, Refills: 0      oxyCODONE-acetaminophen (PERCOCET) 5-325 mg per tablet Take 1 tablet by mouth every 4 to 6 hours as needed for Pain.  Qty: 32 tablet, Refills: 0    Associated Diagnoses: Post-op pain      sertraline (ZOLOFT) 50 MG tablet Take 1 tablet (50 mg total) by mouth once daily.  Qty: 30 tablet, Refills: 11    Associated Diagnoses: Generalized anxiety disorder with panic attacks             Discharge Procedure Orders (must include Diet, Follow-up, Activity):   Discharge Procedure Orders (must include Diet, Follow-up, Activity)    Remove dressing in 72 hours        Follow Up:  Follow up as scheduled.  Resume routine diet.  Activity as tolerated.    No driving day of procedure.

## 2018-12-20 NOTE — OP NOTE
DATE OF PROCEDURE:  12/20/2018    PREOPERATIVE DIAGNOSIS:  Left thumb mass.    POSTOPERATIVE DIAGNOSIS:  Left thumb mass.    PROCEDURE:  Excision of left thumb.    SURGEON:  Mk Escamilla M.D.    ASSISTANT:  None.    ANESTHESIA:  General.    ESTIMATED BLOOD LOSS:  None.    FLUIDS:  Crystalloid.    DRAINS:  None.    TOURNIQUET TIME:  16 minutes at 250 mmHg.    INDICATIONS FOR PROCEDURE:  Mr. Webb is a 28-year-old male, who had a mass on   his thumb, it has been bothersome for him and presents to have it removed.    DESCRIPTION OF PROCEDURE:  After obtaining informed consent, the patient was   taken back to the Operating Room.  General anesthesia performed by the   anesthesia team.  Left upper extremity prepped with DuraPrep, draped in normal   sterile fashion.  The thumb mass that was at the IP joint of the left thumb   dorsally measured about 6 mm in diameter and was raised about that height.  We   made an incision centered about this longitudinally, cut down to dermal layer.    Next, layer down went to the cyst.  There was some dark, purulent, yellowish,   thick material that came out.  We sent this for culture.  We then went ahead and   evacuated that and then dissected out the cystic structure that had   encapsulated the yellowish, viscous material.  Findings seem to be consistent   with an inclusion cyst.  We were satisfied with the excision.  We irrigated the   wound and then closed the skin with 4-0 nylon mattress stitches.  Sterile   dressing applied.  Tourniquet deflated.      ANDREA/LINDA  dd: 12/20/2018 08:50:02 (CST)  td: 12/20/2018 13:51:44 (CST)  Doc ID   #[id number]  Job ID #[id number]    CC:

## 2018-12-20 NOTE — ANESTHESIA POSTPROCEDURE EVALUATION
"Anesthesia Post Evaluation    Patient: Augustine Webb    Procedure(s) Performed: Procedure(s) (LRB):  EXCISION, MASS, HAND-thumb (Left)    Final Anesthesia Type: general  Patient location during evaluation: PACU  Patient participation: Yes- Able to Participate  Level of consciousness: awake and alert and oriented  Post-procedure vital signs: reviewed and stable  Pain management: adequate  Airway patency: patent  PONV status at discharge: No PONV  Anesthetic complications: no      Cardiovascular status: blood pressure returned to baseline and stable  Respiratory status: unassisted and spontaneous ventilation  Hydration status: euvolemic  Follow-up not needed.        Visit Vitals  BP (!) 106/56 (BP Location: Right arm, Patient Position: Lying)   Pulse 65   Temp 36.3 °C (97.3 °F) (Skin)   Resp 15   Ht 5' 9.5" (1.765 m)   Wt 79.4 kg (175 lb)   SpO2 100%   BMI 25.47 kg/m²       Pain/Amelia Score: No Data Recorded      "

## 2018-12-20 NOTE — TRANSFER OF CARE
"Anesthesia Transfer of Care Note    Patient: Augustine Webb    Procedure(s) Performed: Procedure(s) (LRB):  EXCISION, MASS, HAND-thumb (Left)    Patient location: PACU    Anesthesia Type: general    Transport from OR: Transported from OR on 2-3 L/min O2 by NC with adequate spontaneous ventilation    Post pain: adequate analgesia    Post assessment: no apparent anesthetic complications    Post vital signs: stable    Level of consciousness: sedated    Nausea/Vomiting: no nausea/vomiting    Complications: none    Transfer of care protocol was followed      Last vitals:   Visit Vitals  /89 (BP Location: Right arm, Patient Position: Lying)   Pulse 96   Temp 36.3 °C (97.3 °F) (Skin)   Resp 18   Ht 5' 9.5" (1.765 m)   Wt 79.4 kg (175 lb)   SpO2 96%   BMI 25.47 kg/m²     "

## 2018-12-20 NOTE — DISCHARGE INSTRUCTIONS
WOUND CARE    After Surgery    DOS:   May shower in 48 hours   May remove outer dressing in 48 hours. Leave steri-strips in place. If steri-strips get wet, pat dry. If they fall off, do not worry.   Minimal activity for 48 hours.   Advance diet as tolerated.   Resume home medications as prescribed    DONT:   Do not soak in the tub   No driving for 24 hours or while taking narcotic pain medication   DO NOT TAKE ADDITIONAL TYLENOL/ACETAMINOPHEN WHILE TAKING NARCOTIC PAIN MEDICATION THAT CONTAINS TYLENOL/ACETAMINOPHEN.    CALL PHYSICIAN FOR:   Redness, swelling or bleeding.   Fever greater then 101.   Drainage from the incision site that appears infected.   Persistent pain not relieved by pain medication.    RETURN APPOINTMENT: Call to schedule appointment in 10-14 days    FOR EMERGENCIES: Contact your doctor at [phone]      Discharge Instructions: After Your Surgery  Youve just had surgery. During surgery, you were given medicine called anesthesia to keep you relaxed and free of pain. After surgery, you may have some pain or nausea. This is common. Here are some tips for feeling better and getting well after surgery.     Stay on schedule with your medicine.   Going home  Your healthcare provider will show you how to take care of yourself when you go home. He or she will also answer your questions. Have an adult family member or friend drive you home. For the first 24 hours after your surgery:  · Do not drive or use heavy equipment.  · Do not make important decisions or sign legal papers.  · Do not drink alcohol.  · Have someone stay with you, if needed. He or she can watch for problems and help keep you safe.  Be sure to go to all follow-up visits with your healthcare provider. And rest after your surgery for as long as your healthcare provider tells you to.  Coping with pain  If you have pain after surgery, pain medicine will help you feel better. Take it as told, before pain becomes severe.  Also, ask your healthcare provider or pharmacist about other ways to control pain. This might be with heat, ice, or relaxation. And follow any other instructions your surgeon or nurse gives you.  Tips for taking pain medicine  To get the best relief possible, remember these points:  · Pain medicines can upset your stomach. Taking them with a little food may help.  · Most pain relievers taken by mouth need at least 20 to 30 minutes to start to work.  · Taking medicine on a schedule can help you remember to take it. Try to time your medicine so that you can take it before starting an activity. This might be before you get dressed, go for a walk, or sit down for dinner.  · Constipation is a common side effect of pain medicines. Call your healthcare provider before taking any medicines such as laxatives or stool softeners to help ease constipation. Also ask if you should skip any foods. Drinking lots of fluids and eating foods such as fruits and vegetables that are high in fiber can also help. Remember, do not take laxatives unless your surgeon has prescribed them.  · Drinking alcohol and taking pain medicine can cause dizziness and slow your breathing. It can even be deadly. Do not drink alcohol while taking pain medicine.  · Pain medicine can make you react more slowly to things. Do not drive or run machinery while taking pain medicine.  Your healthcare provider may tell you to take acetaminophen to help ease your pain. Ask him or her how much you are supposed to take each day. Acetaminophen or other pain relievers may interact with your prescription medicines or other over-the-counter (OTC) medicines. Some prescription medicines have acetaminophen and other ingredients. Using both prescription and OTC acetaminophen for pain can cause you to overdose. Read the labels on your OTC medicines with care. This will help you to clearly know the list of ingredients, how much to take, and any warnings. It may also help you not  take too much acetaminophen. If you have questions or do not understand the information, ask your pharmacist or healthcare provider to explain it to you before you take the OTC medicine.  Managing nausea  Some people have an upset stomach after surgery. This is often because of anesthesia, pain, or pain medicine, or the stress of surgery. These tips will help you handle nausea and eat healthy foods as you get better. If you were on a special food plan before surgery, ask your healthcare provider if you should follow it while you get better. These tips may help:  · Do not push yourself to eat. Your body will tell you when to eat and how much.  · Start off with clear liquids and soup. They are easier to digest.  · Next try semi-solid foods, such as mashed potatoes, applesauce, and gelatin, as you feel ready.  · Slowly move to solid foods. Dont eat fatty, rich, or spicy foods at first.  · Do not force yourself to have 3 large meals a day. Instead eat smaller amounts more often.  · Take pain medicines with a small amount of solid food, such as crackers or toast, to avoid nausea.     Call your surgeon if  · You still have pain an hour after taking medicine. The medicine may not be strong enough.  · You feel too sleepy, dizzy, or groggy. The medicine may be too strong.  · You have side effects like nausea, vomiting, or skin changes, such as rash, itching, or hives.       If you have obstructive sleep apnea  You were given anesthesia medicine during surgery to keep you comfortable and free of pain. After surgery, you may have more apnea spells because of this medicine and other medicines you were given. The spells may last longer than usual.   At home:  · Keep using the continuous positive airway pressure (CPAP) device when you sleep. Unless your healthcare provider tells you not to, use it when you sleep, day or night. CPAP is a common device used to treat obstructive sleep apnea.  · Talk with your provider before  taking any pain medicine, muscle relaxants, or sedatives. Your provider will tell you about the possible dangers of taking these medicines.  Date Last Reviewed: 12/1/2016  © 0209-7285 [employer]. [address] [place] [address], [place] [address]. All rights reserved. This information is not intended as a substitute for professional medical care. Always follow your healthcare professional's instructions.

## 2018-12-20 NOTE — BRIEF OP NOTE
[facility]  Brief Operative Note     SUMMARY     Surgery Date: 12/20/2018     Surgeon(s) and Role:     * Mk Escamilla MD - Primary    Assistant: none    Pre-op Diagnosis:  Mass of left finger [R22.32]    Post-op Diagnosis:  Mass of left finger [R22.32]    Procedure(s) (LRB):  EXCISION, MASS, HAND-thumb (Left)      Description of the findings of the procedure:  Mass of left finger [R22.32]      Estimated Blood Loss: < 20CC         Specimens:   Specimen (12h ago, onward)    Start     Ordered    12/20/18 0840  Specimen to Pathology - Surgery  Once     Comments:  Pre-op Diagnosis: Mass of left finger [R22.32]Post-op Diagnosis: Procedure(s):EXCISION, MASS, HAND-thumb Number of specimens: 1Name of specimens: 1. Left thumb mass inclusion cyst.     Start Status   12/20/18 0840 Needs to be Collected       12/20/18 0842          Discharge Note    SUMMARY     Admit Date: 12/20/2018    Discharge Date and Time: No discharge date for patient encounter.    Attending Physician: Mk Escamilla MD     Discharge Provider: Mk Escamilla    Final Diagnosis :Mass of left finger [R22.32]    Outcome of Hospitalization, Treatment, Procedure, or Surgery:  Patient was admitted for an outpatient procedure and tolerated it well without complications.    Disposition: Home or Self care    Follow Up/Patient Instructions: The patient will be discharged home after meeting all discharge criteria. The patient will resume a diet as tolerated. Please follow post-operative instructions for activity restrictions. Keep previously planned post-operative follow -up appointment.      No discharge procedures on file.

## 2018-12-20 NOTE — INTERVAL H&P NOTE
The patient has been examined and the H&P has been reviewed:    I concur with the findings and no changes have occurred since H&P was written.    Anesthesia/Surgery risks, benefits and alternative options discussed and understood by patient/family.          Active Hospital Problems    Diagnosis  POA    Mass of finger of left hand [R22.32]  Yes      Resolved Hospital Problems   No resolved problems to display.

## 2018-12-20 NOTE — ANESTHESIA PREPROCEDURE EVALUATION
12/20/2018  Augustine Webb is a 28 y.o., male.    Anesthesia Evaluation    I have reviewed the Patient Summary Reports.    I have reviewed the Nursing Notes.   I have reviewed the Medications.     Review of Systems  Anesthesia Hx:  No problems with previous Anesthesia    Social:  Former Smoker    Cardiovascular:  Cardiovascular Normal     Pulmonary:  Pulmonary Normal    Renal/:  Renal/ Normal     Neurological:  Neurology Normal    Endocrine:  Endocrine Normal    Psych:   Psychiatric History anxiety Pt takes no meds for anxiety         Physical Exam  General:  Well nourished    Airway/Jaw/Neck:  Airway Findings: Mouth Opening: Normal Tongue: Normal  General Airway Assessment: Adult  Oropharynx Findings:  Mallampati: II  Jaw/Neck Findings:  Neck ROM: Normal ROM     Eyes/Ears/Nose:  Eyes/Ears/Nose Findings:    Dental:  Dental Findings:   Chest/Lungs:  Chest/Lungs Findings: Normal Respiratory Rate     Heart/Vascular:  Heart Findings: Rate: Normal  Rhythm: Regular Rhythm        Mental Status:  Mental Status Findings:  Cooperative, Alert and Oriented         Anesthesia Plan  Type of Anesthesia, risks & benefits discussed:  Anesthesia Type:  general  Patient's Preference:   Intra-op Monitoring Plan: standard ASA monitors  Intra-op Monitoring Plan Comments:   Post Op Pain Control Plan: multimodal analgesia  Post Op Pain Control Plan Comments:   Induction:   IV  Beta Blocker:  Patient is not currently on a Beta-Blocker (No further documentation required).       Informed Consent: Patient understands risks and agrees with Anesthesia plan.  Questions answered. Anesthesia consent signed with patient.  ASA Score: 2     Day of Surgery Review of History & Physical:  There are no significant changes.   H&P completed by Anesthesiologist.       Ready For Surgery From Anesthesia Perspective.

## 2018-12-20 NOTE — PLAN OF CARE
Tolerating PO liquids. Pain well managed. PIV removed.  Pressure dressing applied.  Discharge instructions reviewed with patient.  All questions answered.  D/C via wheelchair to personal vehicle.

## 2018-12-21 VITALS
TEMPERATURE: 97 F | RESPIRATION RATE: 12 BRPM | HEART RATE: 69 BPM | OXYGEN SATURATION: 98 % | WEIGHT: 175 LBS | BODY MASS INDEX: 25.05 KG/M2 | HEIGHT: 70 IN | DIASTOLIC BLOOD PRESSURE: 76 MMHG | SYSTOLIC BLOOD PRESSURE: 132 MMHG

## 2018-12-24 LAB
BACTERIA SPEC AEROBE CULT: NO GROWTH
BACTERIA SPEC ANAEROBE CULT: NORMAL

## 2019-01-03 ENCOUNTER — OFFICE VISIT (OUTPATIENT)
Dept: ORTHOPEDICS | Facility: CLINIC | Age: 29
End: 2019-01-03
Payer: COMMERCIAL

## 2019-01-03 VITALS — WEIGHT: 175 LBS | HEIGHT: 70 IN | BODY MASS INDEX: 25.05 KG/M2

## 2019-01-03 DIAGNOSIS — R22.32 MASS OF FINGER OF LEFT HAND: Primary | ICD-10-CM

## 2019-01-03 PROCEDURE — 99999 PR PBB SHADOW E&M-EST. PATIENT-LVL II: ICD-10-PCS | Mod: PBBFAC,,, | Performed by: ORTHOPAEDIC SURGERY

## 2019-01-03 PROCEDURE — 99024 PR POST-OP FOLLOW-UP VISIT: ICD-10-PCS | Mod: S$GLB,,, | Performed by: ORTHOPAEDIC SURGERY

## 2019-01-03 PROCEDURE — 99999 PR PBB SHADOW E&M-EST. PATIENT-LVL II: CPT | Mod: PBBFAC,,, | Performed by: ORTHOPAEDIC SURGERY

## 2019-01-03 PROCEDURE — 99024 POSTOP FOLLOW-UP VISIT: CPT | Mod: S$GLB,,, | Performed by: ORTHOPAEDIC SURGERY

## 2019-01-03 NOTE — PROGRESS NOTES
HISTORY OF PRESENT ILLNESS:  28 years old, two weeks out from removal of some   mass, epidermoid cyst on pathology report.  Sutures removed.  No signs of   infection.    PLAN:  Activities to tolerance.  Follow up as needed.      ANDREA/LINDA  dd: 01/03/2019 15:52:16 (CST)  td: 01/04/2019 01:38:58 (CST)  Doc ID   #[id number]  Job ID #[id number]    CC:

## 2019-01-22 LAB — FUNGUS SPEC CULT: NORMAL

## 2019-08-28 ENCOUNTER — OFFICE VISIT (OUTPATIENT)
Dept: URGENT CARE | Facility: CLINIC | Age: 29
End: 2019-08-28
Payer: COMMERCIAL

## 2019-08-28 VITALS
TEMPERATURE: 98 F | BODY MASS INDEX: 25.05 KG/M2 | HEART RATE: 123 BPM | OXYGEN SATURATION: 97 % | WEIGHT: 175 LBS | RESPIRATION RATE: 16 BRPM | HEIGHT: 70 IN | DIASTOLIC BLOOD PRESSURE: 91 MMHG | SYSTOLIC BLOOD PRESSURE: 139 MMHG

## 2019-08-28 DIAGNOSIS — G25.81 RESTLESS LEG SYNDROME: ICD-10-CM

## 2019-08-28 DIAGNOSIS — J02.9 VIRAL PHARYNGITIS: ICD-10-CM

## 2019-08-28 DIAGNOSIS — Z20.818 EXPOSURE TO STREP THROAT: Primary | ICD-10-CM

## 2019-08-28 LAB
CTP QC/QA: YES
S PYO RRNA THROAT QL PROBE: NEGATIVE

## 2019-08-28 PROCEDURE — 99213 PR OFFICE/OUTPT VISIT, EST, LEVL III, 20-29 MIN: ICD-10-PCS | Mod: 25,S$GLB,, | Performed by: FAMILY MEDICINE

## 2019-08-28 PROCEDURE — 87880 POCT RAPID STREP A: ICD-10-PCS | Mod: QW,S$GLB,, | Performed by: FAMILY MEDICINE

## 2019-08-28 PROCEDURE — 99213 OFFICE O/P EST LOW 20 MIN: CPT | Mod: 25,S$GLB,, | Performed by: FAMILY MEDICINE

## 2019-08-28 PROCEDURE — 96372 PR INJECTION,THERAP/PROPH/DIAG2ST, IM OR SUBCUT: ICD-10-PCS | Mod: S$GLB,,, | Performed by: FAMILY MEDICINE

## 2019-08-28 PROCEDURE — 96372 THER/PROPH/DIAG INJ SC/IM: CPT | Mod: S$GLB,,, | Performed by: FAMILY MEDICINE

## 2019-08-28 PROCEDURE — 87880 STREP A ASSAY W/OPTIC: CPT | Mod: QW,S$GLB,, | Performed by: FAMILY MEDICINE

## 2019-08-28 RX ORDER — CYCLOBENZAPRINE HCL 10 MG
10 TABLET ORAL 3 TIMES DAILY PRN
Qty: 20 TABLET | Refills: 0 | Status: SHIPPED | OUTPATIENT
Start: 2019-08-28 | End: 2019-09-07

## 2019-08-28 RX ORDER — DEXAMETHASONE SODIUM PHOSPHATE 100 MG/10ML
10 INJECTION INTRAMUSCULAR; INTRAVENOUS
Status: COMPLETED | OUTPATIENT
Start: 2019-08-28 | End: 2019-08-28

## 2019-08-28 RX ADMIN — DEXAMETHASONE SODIUM PHOSPHATE 10 MG: 100 INJECTION INTRAMUSCULAR; INTRAVENOUS at 05:08

## 2019-08-28 NOTE — PATIENT INSTRUCTIONS
Restless Legs Syndrome: What You Can Do  Symptoms of restless leg syndrome (RLS) can be treated. Together, you and your health care provider can work on your treatment plan. If needed, medications may be prescribed. Also learn what you can do to ease your discomfort. Good sleep habits and a healthy lifestyle will help you rest better at night and have more energy during the day.    Working with your health care provider  RLS may occur on its own and may be passed on in families. It is sometimes linked to other medical problems. Low iron may cause some RLS symptoms. Your health care provider may order a lab test to check your iron level. Other medical problems associated with RLS are kidney disease, diabetes, and multiple sclerosis. Your doctor may prescribe medications to reduce your symptoms and help you sleep better.  Tips for temporary relief  To reduce your discomfort, try the following:  · Walking or stretching  · Rubbing your legs  · Having a massage  · Taking a hot or cold bath  · Doing activities that make muscles in your hands or legs work  · Relaxing with yoga or meditation  Good sleep habits  Even though you have RLS, you can still have restful sleep. Try these good sleeping habits:  · Keep a regular sleep schedule. Go to bed and get up at the same time each day.  · Avoid or limit naps.  · Make sure the bedroom is quiet, dark, and not too hot or too cold.  · Use your bed only for sleep and sex.  Healthy lifestyle  Your lifestyle affects your health and your sleep. Here are some healthy habits:  · Eat a balanced diet. To get enough vitamins and minerals, you may also need to take supplements.  · Manage stress and learn ways to relax. Deep breathing techniques and visualization can help to relax your muscles and calm your mind.  · Exercise regularly. It can help reduce stress. Also, you will have more energy during the day and be more tired at bedtime. Afternoon exercise is best. Nighttime exercise may  affect how well you sleep.  · Avoid alcohol, nicotine, and caffeine.  Date Last Reviewed: 6/7/2015  © 4449-1780 [employer]. [address] [place] [address], [place] [address]. All rights reserved. This information is not intended as a substitute for professional medical care. Always follow your healthcare professional's instructions.        Viral Pharyngitis (Sore Throat)    You (or your child, if your child is the patient) have pharyngitis (sore throat). This infection is caused by a virus. It can cause throat pain that is worse when swallowing, aching all over, headache, and fever. The infection may be spread by coughing, kissing, or touching others after touching your mouth or nose. Antibiotic medications do not work against viruses, so they are not used for treating this condition.  Home care  · If your symptoms are severe, rest at home. Return to work or school when you feel well enough.   · Drink plenty of fluids to avoid dehydration.  · For children: Use acetaminophen for fever, fussiness or discomfort. In infants over six months of age, you may use ibuprofen instead of acetaminophen. (NOTE: If your child has chronic liver or kidney disease or ever had a stomach ulcer or GI bleeding, talk with your doctor before using these medicines.) (NOTE: Aspirin should never be used in anyone under 18 years of age who is ill with a fever. It may cause severe liver damage.)   · For adults: You may use acetaminophen or ibuprofen to control pain or fever, unless another medicine was prescribed for this. (NOTE: If you have chronic liver or kidney disease or ever had a stomach ulcer or GI bleeding, talk with your doctor before using these medicines.)  · Throat lozenges or numbing throat sprays can help reduce pain. Gargling with warm salt water will also help reduce throat pain. For this, dissolve 1/2 teaspoon of salt in 1 glass of warm water. To help soothe a sore throat, children can sip on juice or a popsicle.  Children 5 years and older can also suck on a lollipop or hard candy.  · Avoid salty or spicy foods, which can be irritating to the throat.  Follow-up care  Follow up with your healthcare provider or our staff if you are not improving over the next week.  When to seek medical advice  Call your healthcare provider right away if any of these occur:  · Fever as directed by your doctor.  For children, seek care if:  ¨ Your child is of any age and has repeated fevers above 104°F (40°C).  ¨ Your child is younger than 2 years of age and has a fever of 100.4°F (38°C) that continues for more than 1 day.  ¨ Your child is 2 years old or older and has a fever of 100.4°F (38°C) that continues for more than 3 days.  · New or worsening ear pain, sinus pain, or headache  · Painful lumps in the back of neck  · Stiff neck  · Lymph nodes are getting larger  · Inability to swallow liquids, excessive drooling, or inability to open mouth wide due to throat pain  · Signs of dehydration (very dark urine or no urine, sunken eyes, dizziness)  · Trouble breathing or noisy breathing  · Muffled voice  · New rash  · Child appears to be getting sicker  Date Last Reviewed: 4/13/2015  © 5483-4495 [employer]. [address] [place] [address], [place] [address]. All rights reserved. This information is not intended as a substitute for professional medical care. Always follow your healthcare professional's instructions.

## 2019-08-28 NOTE — PROGRESS NOTES
Subjective:       Patient ID: Augustine Webb is a 29 y.o. male.    Vitals:  vitals were not taken for this visit.     Chief Complaint: Sore Throat    Pt states that he has a sore throat and he is congested in his nose.  States that it just started this morning.     Sore Throat    This is a new problem. The current episode started today. The problem has been unchanged. The pain is at a severity of 3/10. The pain is mild. Associated symptoms include congestion. Pertinent negatives include no coughing, diarrhea, headaches, shortness of breath or vomiting. He has had exposure to strep. He has tried nothing for the symptoms. The treatment provided no relief.    Patient also reports occasional episodes of restless leg syndrome which keeps him from sleeping at night he has tried trazodone in the past but the drowsiness the next morning was intolerable he is asking if there is another option.    Constitution: Negative for chills, fatigue and fever.   HENT: Positive for congestion and sore throat.    Neck: Negative for painful lymph nodes.   Cardiovascular: Negative for chest pain and leg swelling.   Eyes: Negative for double vision and blurred vision.   Respiratory: Negative for cough and shortness of breath.    Gastrointestinal: Negative for nausea, vomiting and diarrhea.   Genitourinary: Negative for dysuria, frequency and urgency.   Musculoskeletal: Negative for joint pain, joint swelling, muscle cramps and muscle ache.   Skin: Negative for color change, pale and rash.   Allergic/Immunologic: Negative for seasonal allergies.   Neurological: Negative for dizziness, history of vertigo, light-headedness, passing out and headaches.   Hematologic/Lymphatic: Negative for swollen lymph nodes, easy bruising/bleeding and history of blood clots. Does not bruise/bleed easily.   Psychiatric/Behavioral: Negative for nervous/anxious, sleep disturbance and depression. The patient is not nervous/anxious.        Objective:      Physical  Exam   Constitutional: He is oriented to person, place, and time. He appears well-developed and well-nourished. He is cooperative.  Non-toxic appearance. He does not appear ill. No distress.   HENT:   Head: Normocephalic and atraumatic.   Right Ear: Hearing, tympanic membrane, external ear and ear canal normal.   Left Ear: Hearing, tympanic membrane, external ear and ear canal normal.   Nose: Nose normal. No mucosal edema, rhinorrhea or nasal deformity. No epistaxis. Right sinus exhibits no maxillary sinus tenderness and no frontal sinus tenderness. Left sinus exhibits no maxillary sinus tenderness and no frontal sinus tenderness.   Mouth/Throat: Uvula is midline, oropharynx is clear and moist and mucous membranes are normal. No trismus in the jaw. Normal dentition. No uvula swelling. No posterior oropharyngeal erythema.   Eyes: Conjunctivae and lids are normal. No scleral icterus.   Sclera clear bilat   Neck: Trachea normal, full passive range of motion without pain and phonation normal. Neck supple.   Cardiovascular: Normal rate, regular rhythm, normal heart sounds, intact distal pulses and normal pulses.   Pulmonary/Chest: Effort normal and breath sounds normal. No respiratory distress.   Abdominal: Soft. Normal appearance and bowel sounds are normal. He exhibits no distension. There is no tenderness.   Musculoskeletal: Normal range of motion. He exhibits no edema or deformity.   Neurological: He is alert and oriented to person, place, and time. He exhibits normal muscle tone. Coordination normal.   Skin: Skin is warm, dry and intact. He is not diaphoretic. No pallor.   Psychiatric: He has a normal mood and affect. His speech is normal and behavior is normal. Judgment and thought content normal. Cognition and memory are normal.   Nursing note and vitals reviewed.      Assessment:       No diagnosis found.    Plan:         There are no diagnoses linked to this encounter.

## 2020-04-24 ENCOUNTER — OFFICE VISIT (OUTPATIENT)
Dept: URGENT CARE | Facility: CLINIC | Age: 30
End: 2020-04-24
Payer: COMMERCIAL

## 2020-04-24 VITALS — HEART RATE: 60 BPM | OXYGEN SATURATION: 97 % | TEMPERATURE: 97 F

## 2020-04-24 DIAGNOSIS — Z20.822 CLOSE EXPOSURE TO COVID-19 VIRUS: Primary | ICD-10-CM

## 2020-04-24 PROCEDURE — U0002 COVID-19 LAB TEST NON-CDC: HCPCS

## 2020-04-24 PROCEDURE — 99213 OFFICE O/P EST LOW 20 MIN: CPT | Mod: S$GLB,,, | Performed by: FAMILY MEDICINE

## 2020-04-24 PROCEDURE — 99213 PR OFFICE/OUTPT VISIT, EST, LEVL III, 20-29 MIN: ICD-10-PCS | Mod: S$GLB,,, | Performed by: FAMILY MEDICINE

## 2020-04-24 NOTE — LETTER
April 24, 2020      [facility]  [address], [address]  [facility] [address]  Phone: [phone]  Fax: [phone]       Patient: Augustine Webb   YOB: 1990  Date of Visit: 04/24/2020    To Whom It May Concern:    Octavio Webb  was at [facility] on 04/24/2020. Return to Work Protocol & Process for employee with symptoms concerning for COVID-19  The below are simply guidelines of our health system for our  employees--- Your Employer may have different criteria for timeline to return to work:      If patient has NOT been tested for COVID 19 but is symptomatic  exclude from work until:    At least 3 days (72 hours) have passed since recovery defined as resolution of fever without the use of  fever-reducing medications AND improvement in respiratory symptoms (e.g., cough, shortness of  breath); AND At least 7 days have passed since symptoms first appeared.    If patient has been tested for COVID19:    --IF Test results are NEGATIVE exclude from work until:  o 24 hours fever-free without the use of fever-reducing medications AND  o Improvement in respiratory symptoms (e.g. cough, shortness of breath)    --IF test results are POSITIVE exclude from work until:  o At least 3 days (72 hours) have passed since recovery defined as resolution of  fever without the use of fever-reducing medications AND improvement in  respiratory symptoms (e.g., cough, shortness of breath); AND  o At least 7 days have passed since symptoms first appeared.      Return to Work Practices - After returning to work, patient should:   Wear a facemask at all times while in your work facility until all symptoms are completely  resolved or until 14 days after illness onset, whichever is longer, to protect yourself and co-workers.    Be restricted from contact with severely immunocompromised patients (e.g., transplant,  hematology-oncology) until 14 days after illness onset   Adhere to  hand hygiene, respiratory hygiene, and cough etiquette in CDCs interim infection  control guidance (e.g., cover nose and mouth when coughing or sneezing, dispose of tissues in  waste receptacles)   Self-monitor for symptoms, and seek re-evaluation  if respiratory  symptoms recur or worsen  If you have any questions or concerns, or if I can be of further assistance, please do not hesitate to contact me.    Sincerely,    Van Chahal MD

## 2020-04-24 NOTE — PROGRESS NOTES
Subjective:       Patient ID: Augustine Webb is a 30 y.o. male.    Vitals:  temperature is 96.8 °F (36 °C). His pulse is 60. His oxygen saturation is 97%.     Chief Complaint: Sore Throat    Pt presents to urgent care with a sore throat.  He states that his wife is a nurse at [employer] and got tested and was positive.  He states that now he is having a sore throat and runny nose.  He states that his boss told him that he has to get tested.     Sore Throat    This is a new problem. The current episode started in the past 7 days. The problem has been gradually worsening. The pain is at a severity of 2/10. The pain is mild. Pertinent negatives include no congestion, coughing, diarrhea, headaches, shortness of breath or vomiting. He has tried nothing for the symptoms. The treatment provided no relief.       Constitution: Negative for chills, fatigue and fever.   HENT: Positive for sore throat. Negative for congestion.    Neck: Negative for painful lymph nodes.   Cardiovascular: Negative for chest pain and leg swelling.   Eyes: Negative for double vision and blurred vision.   Respiratory: Negative for cough and shortness of breath.    Gastrointestinal: Negative for nausea, vomiting and diarrhea.   Genitourinary: Negative for dysuria, frequency and urgency.   Musculoskeletal: Negative for joint pain, joint swelling, muscle cramps and muscle ache.   Skin: Negative for color change, pale and rash.   Allergic/Immunologic: Negative for seasonal allergies.   Neurological: Negative for dizziness, history of vertigo, light-headedness, passing out and headaches.   Hematologic/Lymphatic: Negative for swollen lymph nodes, easy bruising/bleeding and history of blood clots. Does not bruise/bleed easily.   Psychiatric/Behavioral: Negative for nervous/anxious, sleep disturbance and depression. The patient is not nervous/anxious.        Objective:      Physical Exam      Patient was seen remotely due to State of Emergency for the  COVID-19 outbreak.    Assessment:       1. Close Exposure to Covid-19 Virus        Plan:       Wife with covid- advised should have precautions as if positive. Letter sent via [employer].   Close Exposure to Covid-19 Virus  -     COVID-19 Routine Screening

## 2020-04-26 ENCOUNTER — TELEPHONE (OUTPATIENT)
Dept: URGENT CARE | Facility: CLINIC | Age: 30
End: 2020-04-26

## 2020-04-26 LAB — SARS-COV-2 RNA RESP QL NAA+PROBE: NOT DETECTED

## 2020-04-26 NOTE — TELEPHONE ENCOUNTER
covid 19 testing negative.  Your test was NEGATIVE for COVID-19 (coronavirus).  If you still have symptoms, treat with rest, fluids, and over-the-counter medications.  Continue to stay home and practice proper handwashing.

## 2020-10-15 PROBLEM — R22.32 SUBCUTANEOUS MASS OF LEFT THUMB: Status: ACTIVE | Noted: 2020-10-15

## 2020-12-31 ENCOUNTER — CLINICAL SUPPORT (OUTPATIENT)
Dept: URGENT CARE | Facility: CLINIC | Age: 30
End: 2020-12-31
Payer: COMMERCIAL

## 2020-12-31 DIAGNOSIS — R51.9 ACUTE INTRACTABLE HEADACHE, UNSPECIFIED HEADACHE TYPE: Primary | ICD-10-CM

## 2020-12-31 LAB
CTP QC/QA: YES
SARS-COV-2 RDRP RESP QL NAA+PROBE: NEGATIVE

## 2020-12-31 PROCEDURE — U0002: ICD-10-PCS | Mod: QW,S$GLB,, | Performed by: FAMILY MEDICINE

## 2020-12-31 PROCEDURE — U0002 COVID-19 LAB TEST NON-CDC: HCPCS | Mod: QW,S$GLB,, | Performed by: FAMILY MEDICINE

## 2021-04-29 ENCOUNTER — PATIENT MESSAGE (OUTPATIENT)
Dept: RESEARCH | Facility: HOSPITAL | Age: 31
End: 2021-04-29

## 2021-09-08 ENCOUNTER — TELEPHONE (OUTPATIENT)
Dept: UROLOGY | Facility: CLINIC | Age: 31
End: 2021-09-08

## 2021-09-22 ENCOUNTER — CLINICAL SUPPORT (OUTPATIENT)
Dept: URGENT CARE | Facility: CLINIC | Age: 31
End: 2021-09-22
Payer: COMMERCIAL

## 2021-09-22 DIAGNOSIS — Z20.822 ENCOUNTER FOR LABORATORY TESTING FOR COVID-19 VIRUS: ICD-10-CM

## 2021-09-22 LAB
SARS-COV-2 RNA RESP QL NAA+PROBE: NOT DETECTED
SARS-COV-2- CYCLE NUMBER: NORMAL

## 2021-09-22 PROCEDURE — 99211 PR OFFICE/OUTPT VISIT, EST, LEVL I: ICD-10-PCS | Mod: S$GLB,,, | Performed by: FAMILY MEDICINE

## 2021-09-22 PROCEDURE — 99211 OFF/OP EST MAY X REQ PHY/QHP: CPT | Mod: S$GLB,,, | Performed by: FAMILY MEDICINE

## 2021-09-22 PROCEDURE — U0005 INFEC AGEN DETEC AMPLI PROBE: HCPCS | Performed by: FAMILY MEDICINE

## 2021-09-22 PROCEDURE — U0003 INFECTIOUS AGENT DETECTION BY NUCLEIC ACID (DNA OR RNA); SEVERE ACUTE RESPIRATORY SYNDROME CORONAVIRUS 2 (SARS-COV-2) (CORONAVIRUS DISEASE [COVID-19]), AMPLIFIED PROBE TECHNIQUE, MAKING USE OF HIGH THROUGHPUT TECHNOLOGIES AS DESCRIBED BY CMS-2020-01-R: HCPCS | Performed by: FAMILY MEDICINE

## 2021-09-23 ENCOUNTER — TELEPHONE (OUTPATIENT)
Dept: URGENT CARE | Facility: CLINIC | Age: 31
End: 2021-09-23

## 2021-09-30 ENCOUNTER — CLINICAL SUPPORT (OUTPATIENT)
Dept: URGENT CARE | Facility: CLINIC | Age: 31
End: 2021-09-30
Payer: COMMERCIAL

## 2021-09-30 DIAGNOSIS — Z20.822 ENCOUNTER FOR LABORATORY TESTING FOR COVID-19 VIRUS: ICD-10-CM

## 2021-09-30 PROCEDURE — U0005 INFEC AGEN DETEC AMPLI PROBE: HCPCS | Performed by: FAMILY MEDICINE

## 2021-09-30 PROCEDURE — 99211 PR OFFICE/OUTPT VISIT, EST, LEVL I: ICD-10-PCS | Mod: S$GLB,,, | Performed by: FAMILY MEDICINE

## 2021-09-30 PROCEDURE — U0003 INFECTIOUS AGENT DETECTION BY NUCLEIC ACID (DNA OR RNA); SEVERE ACUTE RESPIRATORY SYNDROME CORONAVIRUS 2 (SARS-COV-2) (CORONAVIRUS DISEASE [COVID-19]), AMPLIFIED PROBE TECHNIQUE, MAKING USE OF HIGH THROUGHPUT TECHNOLOGIES AS DESCRIBED BY CMS-2020-01-R: HCPCS | Performed by: FAMILY MEDICINE

## 2021-09-30 PROCEDURE — 99211 OFF/OP EST MAY X REQ PHY/QHP: CPT | Mod: S$GLB,,, | Performed by: FAMILY MEDICINE

## 2021-10-01 ENCOUNTER — TELEPHONE (OUTPATIENT)
Dept: URGENT CARE | Facility: CLINIC | Age: 31
End: 2021-10-01

## 2021-10-01 LAB — SARS-COV-2 RNA RESP QL NAA+PROBE: NOT DETECTED

## 2022-11-23 ENCOUNTER — OFFICE VISIT (OUTPATIENT)
Dept: URGENT CARE | Facility: CLINIC | Age: 32
End: 2022-11-23
Payer: COMMERCIAL

## 2022-11-23 VITALS
HEIGHT: 70 IN | RESPIRATION RATE: 16 BRPM | DIASTOLIC BLOOD PRESSURE: 76 MMHG | SYSTOLIC BLOOD PRESSURE: 123 MMHG | WEIGHT: 170 LBS | OXYGEN SATURATION: 97 % | BODY MASS INDEX: 24.34 KG/M2 | TEMPERATURE: 100 F | HEART RATE: 76 BPM

## 2022-11-23 DIAGNOSIS — J10.1 INFLUENZA A: Primary | ICD-10-CM

## 2022-11-23 DIAGNOSIS — R05.9 COUGH, UNSPECIFIED TYPE: ICD-10-CM

## 2022-11-23 LAB
CTP QC/QA: YES
POC MOLECULAR INFLUENZA A AGN: POSITIVE
POC MOLECULAR INFLUENZA B AGN: NEGATIVE

## 2022-11-23 PROCEDURE — 87502 INFLUENZA DNA AMP PROBE: CPT | Mod: QW,S$GLB,, | Performed by: PHYSICIAN ASSISTANT

## 2022-11-23 PROCEDURE — 1159F PR MEDICATION LIST DOCUMENTED IN MEDICAL RECORD: ICD-10-PCS | Mod: CPTII,S$GLB,, | Performed by: PHYSICIAN ASSISTANT

## 2022-11-23 PROCEDURE — 99214 OFFICE O/P EST MOD 30 MIN: CPT | Mod: S$GLB,,, | Performed by: PHYSICIAN ASSISTANT

## 2022-11-23 PROCEDURE — 3078F DIAST BP <80 MM HG: CPT | Mod: CPTII,S$GLB,, | Performed by: PHYSICIAN ASSISTANT

## 2022-11-23 PROCEDURE — 99214 PR OFFICE/OUTPT VISIT, EST, LEVL IV, 30-39 MIN: ICD-10-PCS | Mod: S$GLB,,, | Performed by: PHYSICIAN ASSISTANT

## 2022-11-23 PROCEDURE — 1160F PR REVIEW ALL MEDS BY PRESCRIBER/CLIN PHARMACIST DOCUMENTED: ICD-10-PCS | Mod: CPTII,S$GLB,, | Performed by: PHYSICIAN ASSISTANT

## 2022-11-23 PROCEDURE — 3008F BODY MASS INDEX DOCD: CPT | Mod: CPTII,S$GLB,, | Performed by: PHYSICIAN ASSISTANT

## 2022-11-23 PROCEDURE — 1159F MED LIST DOCD IN RCRD: CPT | Mod: CPTII,S$GLB,, | Performed by: PHYSICIAN ASSISTANT

## 2022-11-23 PROCEDURE — 3074F SYST BP LT 130 MM HG: CPT | Mod: CPTII,S$GLB,, | Performed by: PHYSICIAN ASSISTANT

## 2022-11-23 PROCEDURE — 3078F PR MOST RECENT DIASTOLIC BLOOD PRESSURE < 80 MM HG: ICD-10-PCS | Mod: CPTII,S$GLB,, | Performed by: PHYSICIAN ASSISTANT

## 2022-11-23 PROCEDURE — 3074F PR MOST RECENT SYSTOLIC BLOOD PRESSURE < 130 MM HG: ICD-10-PCS | Mod: CPTII,S$GLB,, | Performed by: PHYSICIAN ASSISTANT

## 2022-11-23 PROCEDURE — 3008F PR BODY MASS INDEX (BMI) DOCUMENTED: ICD-10-PCS | Mod: CPTII,S$GLB,, | Performed by: PHYSICIAN ASSISTANT

## 2022-11-23 PROCEDURE — 87502 POCT INFLUENZA A/B MOLECULAR: ICD-10-PCS | Mod: QW,S$GLB,, | Performed by: PHYSICIAN ASSISTANT

## 2022-11-23 PROCEDURE — 1160F RVW MEDS BY RX/DR IN RCRD: CPT | Mod: CPTII,S$GLB,, | Performed by: PHYSICIAN ASSISTANT

## 2022-11-23 RX ORDER — AZELASTINE 1 MG/ML
1 SPRAY, METERED NASAL 2 TIMES DAILY
Qty: 30 ML | Refills: 0 | Status: SHIPPED | OUTPATIENT
Start: 2022-11-23 | End: 2023-09-12

## 2022-11-23 RX ORDER — OSELTAMIVIR PHOSPHATE 75 MG/1
75 CAPSULE ORAL 2 TIMES DAILY
Qty: 10 CAPSULE | Refills: 0 | Status: SHIPPED | OUTPATIENT
Start: 2022-11-23 | End: 2022-11-28

## 2022-11-23 RX ORDER — ALBUTEROL SULFATE 90 UG/1
2 AEROSOL, METERED RESPIRATORY (INHALATION) EVERY 6 HOURS PRN
Qty: 18 G | Refills: 0 | Status: SHIPPED | OUTPATIENT
Start: 2022-11-23 | End: 2023-09-12

## 2022-11-23 RX ORDER — BENZONATATE 200 MG/1
200 CAPSULE ORAL 3 TIMES DAILY PRN
Qty: 30 CAPSULE | Refills: 0 | Status: SHIPPED | OUTPATIENT
Start: 2022-11-23 | End: 2022-12-03

## 2022-11-23 RX ORDER — PREDNISONE 10 MG/1
TABLET ORAL
Qty: 11 TABLET | Refills: 0 | Status: SHIPPED | OUTPATIENT
Start: 2022-11-23 | End: 2023-09-12

## 2022-11-23 NOTE — PROGRESS NOTES
"Subjective:       Patient ID: Augustine Webb is a 32 y.o. male.    Vitals:  height is 5' 10" (1.778 m) and weight is 77.1 kg (170 lb). His oral temperature is 100.2 °F (37.9 °C). His blood pressure is 123/76 and his pulse is 76. His respiration is 16 and oxygen saturation is 97%.     Chief Complaint: Cough    Pt presents to urgent care with Fever (104), cough, congestion, sweats and fever. Pt symptoms started 2 days ago. Pt is taking ibuprofen for symptoms.     Cough  This is a new problem. The current episode started in the past 7 days. The problem has been gradually worsening. The problem occurs constantly. Cough characteristics: dark colored mucus. Associated symptoms include chills, a fever, headaches, nasal congestion and postnasal drip. Pertinent negatives include no chest pain, ear congestion, ear pain, heartburn, hemoptysis, myalgias, rash, rhinorrhea, sore throat, shortness of breath, sweats, weight loss or wheezing. Nothing aggravates the symptoms. Treatments tried: ibuprofen. The treatment provided no relief. There is no history of asthma, bronchiectasis, bronchitis, COPD, emphysema, environmental allergies or pneumonia.     Constitution: Positive for chills and fever.   HENT:  Positive for postnasal drip. Negative for ear pain and sore throat.    Cardiovascular:  Negative for chest pain.   Respiratory:  Positive for cough. Negative for bloody sputum, shortness of breath and wheezing.    Gastrointestinal:  Negative for heartburn.   Musculoskeletal:  Negative for muscle ache.   Skin:  Negative for rash.   Allergic/Immunologic: Negative for environmental allergies.   Neurological:  Positive for headaches.     Objective:      Physical Exam   Constitutional: He does not appear ill. No distress.   HENT:   Head: Normocephalic and atraumatic.   Ears:   Right Ear: Tympanic membrane, external ear and ear canal normal.   Left Ear: Tympanic membrane, external ear and ear canal normal.   Mouth/Throat: Mucous membranes " are moist. No oropharyngeal exudate or posterior oropharyngeal erythema. Oropharynx is clear.   Eyes: Conjunctivae are normal. Right eye exhibits no discharge. Left eye exhibits no discharge. Extraocular movement intact   Cardiovascular: Normal rate, regular rhythm and normal heart sounds.   No murmur heard.  Pulmonary/Chest: Effort normal. He has wheezes (faint, diffuse). He has no rhonchi. He has no rales.   Abdominal: Normal appearance.   Musculoskeletal: Normal range of motion.         General: Normal range of motion.   Neurological: He is alert.   Skin: Skin is warm, dry and not pale. jaundice  Psychiatric: His behavior is normal. Mood, judgment and thought content normal.   Nursing note and vitals reviewed.      Assessment:       1. Influenza A    2. Cough, unspecified type          Plan:         Influenza A    Cough, unspecified type  -     POCT Influenza A/B MOLECULAR    Results for orders placed or performed in visit on 11/23/22   POCT Influenza A/B MOLECULAR   Result Value Ref Range    POC Molecular Influenza A Ag Positive (A) Negative, Not Reported    POC Molecular Influenza B Ag Negative Negative, Not Reported     Acceptable Yes         Other orders  -     albuterol (VENTOLIN HFA) 90 mcg/actuation inhaler; Inhale 2 puffs into the lungs every 6 (six) hours as needed for Wheezing. Rescue  Dispense: 18 g; Refill: 0  -     predniSONE (DELTASONE) 10 MG tablet; Take 40mg for 1 days, take 30mg for 1 days, take 20mg for 1 days, take 10mg for 2 days  Dispense: 11 tablet; Refill: 0  -     oseltamivir (TAMIFLU) 75 MG capsule; Take 1 capsule (75 mg total) by mouth 2 (two) times daily. for 5 days  Dispense: 10 capsule; Refill: 0  -     azelastine (ASTELIN) 137 mcg (0.1 %) nasal spray; 1 spray (137 mcg total) by Nasal route 2 (two) times daily.  Dispense: 30 mL; Refill: 0  -     benzonatate (TESSALON) 200 MG capsule; Take 1 capsule (200 mg total) by mouth 3 (three) times daily as needed for Cough.   "Dispense: 30 capsule; Refill: 0     Patient enquiring about steroid injection.  Discussed why risks do not outweigh benefits of injection.  Discussed short course low-dose steroid orally.  Patient would like to proceed.       Flu   The Basics   Written by the doctors and editors at [facility]   What is the flu? -- The flu is an infection that can cause fever, cough, body aches, and other symptoms. The most common type of flu is the "seasonal" flu. There are different forms of seasonal flu, for example, "type A" and "type B."  All forms of the flu are caused by viruses. The medical term for the flu is "influenza."  What are the other types of flu? -- Besides seasonal flu, there is also the "swine" flu, which caused a worldwide outbreak ("pandemic") in 2009 and 2010, and the bird flu. Bird flu (also known as "heena flu") is a severe form of the flu that is caused by a type of flu virus that first infected birds.  What are the most common flu symptoms? -- All forms of the flu can cause:  Fever (temperature higher than 100ºF or 37.8ºC)  Extreme tiredness  Headache or body aches  Cough  Sore throat  Runny nose  Flu symptoms can come on very suddenly.  Is the flu dangerous? -- It can be. Most people get over the flu on their own, without any lasting problems. But some people need to go to the hospital because of the flu. And some people even die from it. This is because the flu can cause a serious lung infection called pneumonia. That's why it's important to keep from getting the flu in the first place.  People at higher risk of getting very sick from the flu include:  People 65 or older  Young children (under 5 years old, and especially under 2 years old)  Pregnant women  People with certain other medical problems  If you or your child is in one of these groups, talk to a doctor or nurse. He or she can help you decide if you or your child needs treatment. In some cases, family members of a person with the flu might also " need medicine to help prevent them from getting it.  Is there a test for flu? -- Yes. There are tests for the flu. In most cases, your doctor can tell if you have the flu by your symptoms. But in some cases - for example, if you are at risk for having other problems caused by the flu - your doctor might do a test for flu.  How can I protect myself from the flu? -- You can:  Wash your hands often with soap and water. The table has instructions on how to wash your hands to prevent spreading illness (table 1).  Stay away from people you know are sick  Get the flu vaccine every year - Some years the flu vaccine is more effective than others. But even in years when it is less effective, it still helps prevent some cases of the flu. It can also help keep you from getting severely ill if you do get the flu.  What should I do if I get the flu? -- If you think you have the flu, stay home, rest, and drink plenty of fluids. You can also take acetaminophen (sample brand name: Tylenol) to relieve fever and aches.  Do not give aspirin or medicines that contain aspirin to children younger than 18. In children, aspirin can cause a serious problem called Reye syndrome.  Most people with the flu get better on their own within 1 to 2 weeks. But you should call your doctor or nurse if you:  Have trouble breathing or are short of breath  Feel pain or pressure in your chest or belly  Get suddenly dizzy  Feel confused  Have severe vomiting  Take your child to the doctor if he or she:  Starts breathing fast or has trouble breathing  Starts to turn blue or purple  Is not drinking enough fluids  Will not wake up or will not interact with you  Is so unhappy that he or she does not want to be held  Gets better from the flu but then gets sick again with a fever or cough  Has a fever with a rash  If you decide to go to a walk-in clinic or a hospital because of the flu, tell someone right away why you are there. The staff might ask you to wear a  mask or to wait someplace where you are less likely to spread your infection.  Whether or not you see a doctor or nurse, you should stay home while you are sick with the flu, or keep your child home if he or she is sick. Do not go to work or school until your fever has been gone for at least 24 hours, without taking medicine such as acetaminophen. If you work with patients, such as in a hospital or clinic, you might need to stay home longer if you are still coughing. Also, always cover your mouth and nose with the inside of your elbow when you cough or sneeze.  Can the flu be treated? -- Yes, people with the flu can get medicines called antiviral medicines. These medicines can help people avoid some of the problems caused by the flu. Not every person with the flu needs an antiviral medicine, but some people do. Your doctor or nurse will decide if you need an antiviral medicine. Antibiotics do not work on the flu.  What if I am pregnant? -- The flu can be very dangerous for pregnant women. If you are pregnant, it is very important that you get the flu vaccine. You should also avoid taking care of anyone who has the flu.  If you are pregnant, call your doctor or nurse right away if:  You might have been near someone with the flu.  You think you might be coming down with the flu. In pregnant women, the symptoms of the flu can get worse very quickly. The flu can even cause trouble breathing or lead to death of the woman or her baby. That is why it is so important that you talk to doctor or nurse as soon as you notice any of the flu symptoms listed above. You will need an antiviral medicine if you are pregnant and have the flu.  All topics are updated as new evidence becomes available and our peer review process is complete.  This topic retrieved from Press on: Sep 21, 2021.  Topic 56867 Version 15.0  Release: 29.4.2 - C29.263  © 2021 UpToDate, Inc. and/or its affiliates. All rights reserved.  table 1: Hand washing to  prevent spreading illness  Wet your hands and put soap on them    Rub your hands together for at least 20 seconds. Make sure to clean your wrists, fingernails, and in between your fingers.    Rinse your hands    Dry your hands with a paper towel that you can throw away    If you are not near a sink, you can use a hand gel to clean your hands. The gels with at least 60 percent alcohol work the best. But it is better to wash with soap and water if you can.  Graphic 635260 Version 3.0  Consumer Information Use and Disclaimer   This information is not specific medical advice and does not replace information you receive from your health care provider. This is only a brief summary of general information. It does NOT include all information about conditions, illnesses, injuries, tests, procedures, treatments, therapies, discharge instructions or life-style choices that may apply to you. You must talk with your health care provider for complete information about your health and treatment options. This information should not be used to decide whether or not to accept your health care provider's advice, instructions or recommendations. Only your health care provider has the knowledge and training to provide advice that is right for you. The use of this information is governed by the [employer] End User License Agreement, available at https://www.[employer]/en/solutions/[email or web address]/zora.The use of [employer] content is governed by the [employer] Terms of Use. ©2021 UpToDate, Inc. All rights reserved.  Copyright   © 2021 UpToDate, Inc. and/or its affiliates. All rights reserved.

## 2023-08-11 ENCOUNTER — PATIENT MESSAGE (OUTPATIENT)
Dept: CARDIOLOGY | Facility: CLINIC | Age: 33
End: 2023-08-11
Payer: COMMERCIAL

## 2023-08-14 NOTE — PROGRESS NOTES
Subjective:    Patient ID:  Augustine Webb is a 33 y.o. male who presents for evaluation of Blood Pressure Check      Problem List Items Addressed This Visit          Cardiac/Vascular    Heart palpitations - Primary     Other Visit Diagnoses       Encounter for cardiac risk counseling                HPI    Here to establish care    The patient states that he went to the doctor last week and was told there were some irregularities on exam. Was ultimately diagnosed with elevated lipids and acute pericarditis.    Has been having intermittent pain. Worse when he lays back at night and worse with deep breaths.     Got placed on colchicine and ibuprofen by other doctor.    Stent/angiogram history - None   Hydration - Good    Home BP - 135/90, OK this morning      Past Medical History:   Diagnosis Date    Anxiety        Past Surgical History:   Procedure Laterality Date    EXCISION OF MASS OF HAND Left 12/20/2018    Procedure: EXCISION, MASS, HAND-thumb;  Surgeon: Mk Escamilla MD;  Location: [facility];  Service: Orthopedics;  Laterality: Left;    HERNIA REPAIR         Family History   Problem Relation Age of Onset    No Known Problems Mother     No Known Problems Father        Social History     Socioeconomic History    Marital status:    Tobacco Use    Smoking status: Former     Current packs/day: 0.25     Types: Vaping with nicotine, Cigarettes    Smokeless tobacco: Never   Substance and Sexual Activity    Alcohol use: Yes     Comment: occassional    Drug use: No    Sexual activity: Yes     Partners: Female       Review of patient's allergies indicates:  No Known Allergies    Review of Systems   Constitutional: Negative for decreased appetite, fever and malaise/fatigue.   Eyes:  Negative for blurred vision.   Cardiovascular:  Negative for chest pain, dyspnea on exertion, irregular heartbeat and leg swelling.   Respiratory:  Negative for cough, hemoptysis, shortness of breath and wheezing.    Endocrine: Negative  "for cold intolerance and heat intolerance.   Hematologic/Lymphatic: Negative for bleeding problem.   Musculoskeletal:  Negative for muscle weakness and myalgias.   Gastrointestinal:  Negative for abdominal pain, constipation and diarrhea.   Genitourinary:  Negative for bladder incontinence.   Neurological:  Negative for dizziness and weakness.   Psychiatric/Behavioral:  Negative for depression.         Objective:     Vitals:    08/16/23 1119   BP: (!) 146/92   BP Location: Right arm   Patient Position: Sitting   BP Method: Medium (Automatic)   Pulse: 64   Weight: 73.3 kg (161 lb 9.6 oz)   Height: 5' 10" (1.778 m)       BP Readings from Last 5 Encounters:   08/16/23 (!) 146/92   08/09/23 136/78   11/23/22 123/76   08/10/21 130/80   10/13/20 (!) 140/82        Physical Exam  Constitutional:       Appearance: He is well-developed.   HENT:      Head: Normocephalic and atraumatic.   Neck:      Vascular: No JVD.   Cardiovascular:      Rate and Rhythm: Normal rate and regular rhythm.      Heart sounds: Normal heart sounds. No murmur heard.     No friction rub. No gallop.   Pulmonary:      Effort: Pulmonary effort is normal. No respiratory distress.      Breath sounds: Normal breath sounds. No wheezing or rales.   Abdominal:      General: Bowel sounds are normal.      Palpations: Abdomen is soft.      Tenderness: There is no abdominal tenderness. There is no guarding or rebound.   Musculoskeletal:      Cervical back: Normal range of motion and neck supple.   Skin:     General: Skin is warm and dry.   Neurological:      Mental Status: He is alert and oriented to person, place, and time.   Psychiatric:         Behavior: Behavior normal.             Current Outpatient Medications   Medication Instructions    albuterol (VENTOLIN HFA) 90 mcg/actuation inhaler 2 puffs, Inhalation, Every 6 hours PRN, Rescue    azelastine (ASTELIN) 137 mcg, Nasal, 2 times daily    colchicine (gout) (MITIGARE) 0.6 mg, Oral, 2 times daily    ibuprofen " (ADVIL,MOTRIN) 600 mg, Oral, 3 times daily    LORazepam (ATIVAN) 0.5 mg, Oral, Daily PRN    nystatin (MYCOSTATIN) cream Topical (Top), 2 times daily    predniSONE (DELTASONE) 10 MG tablet Take 40mg for 1 days, take 30mg for 1 days, take 20mg for 1 days, take 10mg for 2 days    sertraline (ZOLOFT) 50 mg, Oral, Daily       Lipid Panel:   Lab Results   Component Value Date    CHOL 207 (H) 08/10/2023    HDL 74 08/10/2023    LDLCALC 119.6 08/10/2023    TRIG 67 08/10/2023    CHOLHDL 35.7 08/10/2023         The ASCVD Risk score (Dilan DK, et al., 2019) failed to calculate for the following reasons:    The 2019 ASCVD risk score is only valid for ages 40 to 79    All pertinent labs, imaging, and EKGs reviewed.  Patient's most recent EKG tracing was personally interpreted by this provider.    Most Recent EKG Results  Results for orders placed or performed in visit on 08/10/23   EKG 12-lead    Collection Time: 08/10/23  7:53 AM    Narrative    Test Reason : R00.2,    Vent. Rate : 050 BPM     Atrial Rate : 050 BPM     P-R Int : 154 ms          QRS Dur : 086 ms      QT Int : 436 ms       P-R-T Axes : 024 062 047 degrees     QTc Int : 397 ms    Sinus bradycardia  Possible Acute pericarditis  Abnormal ECG  No previous ECGs available  Confirmed by VINH REAVES MD [id number]) on 8/10/2023 9:07:53 AM    Referred By: LEXUS REYES           Confirmed By:VINH REAVSE MD       Most Recent Echocardiogram Results  Results for orders placed in visit on 05/30/18    Transthoracic echo (TTE) complete    Interpretation Summary  · Left ventricle ejection fraction is normal. EF 60%  · Normal LV diastolic function.  · The left ventricle cavity is normal.  · Left atrium is normal.  · RA cavity size is normal.  · Mitral Valve: Normal valve structure. Normal leaflet mobility.  · Aortic Valve: Normal valve structure.  · Normal central venous pressure (3 mm Hg).  · Pulmonic valve shows trace regurgitation.  · Tricuspid valve shows trace  regurgitation.      Most Recent Nuclear Stress Test Results  No results found for this or any previous visit.      Most Recent Cardiac PET Stress Test Results  No results found for this or any previous visit.      Most Recent Cardiovascular Angiogram results  No results found for this or any previous visit.      Other Most Recent Cardiology Results  Results for orders placed in visit on 05/17/18    Holter monitor - 48 hour    Interpretation Summary  Sinus rhythm with occasional PAC's with normal variations in heart rate.        Assessment:       1. Heart palpitations    2. Encounter for cardiac risk counseling         Plan:     Symptoms seemingly consistent with pericarditis  BP elevated today  Most recent echocardiogram reviewed personally     Echocardiogram    Holter Monitor    Continue sertraline 50 mg PO Daily   Home BP log, call in 1 month with numbers   Continue colchicine from outside provider     Continue other cardiac medications  Mediterranean Diet/Cardiovascular Exercise Program    Patient queried and all questions were answered.    F/u in 9 months to reassess      Signed:    Emiliano Rocha MD  8/16/2023 11:41 AM

## 2023-08-16 ENCOUNTER — OFFICE VISIT (OUTPATIENT)
Dept: CARDIOLOGY | Facility: CLINIC | Age: 33
End: 2023-08-16
Payer: COMMERCIAL

## 2023-08-16 VITALS
HEART RATE: 64 BPM | DIASTOLIC BLOOD PRESSURE: 92 MMHG | HEIGHT: 70 IN | WEIGHT: 161.63 LBS | SYSTOLIC BLOOD PRESSURE: 146 MMHG | BODY MASS INDEX: 23.14 KG/M2

## 2023-08-16 DIAGNOSIS — R00.2 HEART PALPITATIONS: Primary | ICD-10-CM

## 2023-08-16 DIAGNOSIS — Z71.89 ENCOUNTER FOR CARDIAC RISK COUNSELING: ICD-10-CM

## 2023-08-16 PROCEDURE — 99204 OFFICE O/P NEW MOD 45 MIN: CPT | Mod: S$GLB,,, | Performed by: INTERNAL MEDICINE

## 2023-08-16 PROCEDURE — 99999 PR PBB SHADOW E&M-EST. PATIENT-LVL III: ICD-10-PCS | Mod: PBBFAC,,, | Performed by: INTERNAL MEDICINE

## 2023-08-16 PROCEDURE — 99204 PR OFFICE/OUTPT VISIT, NEW, LEVL IV, 45-59 MIN: ICD-10-PCS | Mod: S$GLB,,, | Performed by: INTERNAL MEDICINE

## 2023-08-16 PROCEDURE — 99999 PR PBB SHADOW E&M-EST. PATIENT-LVL III: CPT | Mod: PBBFAC,,, | Performed by: INTERNAL MEDICINE

## 2023-08-31 ENCOUNTER — CLINICAL SUPPORT (OUTPATIENT)
Dept: CARDIOLOGY | Facility: HOSPITAL | Age: 33
End: 2023-08-31
Payer: COMMERCIAL

## 2023-08-31 DIAGNOSIS — R00.2 PALPITATIONS: ICD-10-CM

## 2023-08-31 PROCEDURE — 93244 EXT ECG>48HR<7D REV&INTERPJ: CPT | Mod: ,,, | Performed by: INTERNAL MEDICINE

## 2023-08-31 PROCEDURE — 93242 EXT ECG>48HR<7D RECORDING: CPT | Mod: PO

## 2023-08-31 PROCEDURE — 93244 HOLTER MONITOR - 72 HOUR: ICD-10-PCS | Mod: ,,, | Performed by: INTERNAL MEDICINE

## 2023-09-06 LAB
OHS CV EVENT MONITOR DAY: 1
OHS CV HOLTER LENGTH DECIMAL HOURS: 46
OHS CV HOLTER LENGTH HOURS: 22
OHS CV HOLTER LENGTH MINUTES: 0
OHS CV HOLTER SINUS AVERAGE HR: 72
OHS CV HOLTER SINUS MAX HR: 182
OHS CV HOLTER SINUS MIN HR: 40

## 2023-09-23 ENCOUNTER — OFFICE VISIT (OUTPATIENT)
Dept: URGENT CARE | Facility: CLINIC | Age: 33
End: 2023-09-23
Payer: COMMERCIAL

## 2023-09-23 VITALS
TEMPERATURE: 98 F | SYSTOLIC BLOOD PRESSURE: 122 MMHG | DIASTOLIC BLOOD PRESSURE: 80 MMHG | HEART RATE: 62 BPM | OXYGEN SATURATION: 99 % | BODY MASS INDEX: 23.62 KG/M2 | WEIGHT: 165 LBS | RESPIRATION RATE: 18 BRPM | HEIGHT: 70 IN

## 2023-09-23 DIAGNOSIS — R10.13 EPIGASTRIC PAIN: Primary | ICD-10-CM

## 2023-09-23 DIAGNOSIS — R19.7 DIARRHEA, UNSPECIFIED TYPE: ICD-10-CM

## 2023-09-23 PROCEDURE — 99213 OFFICE O/P EST LOW 20 MIN: CPT | Mod: S$GLB,,, | Performed by: PHYSICIAN ASSISTANT

## 2023-09-23 PROCEDURE — 99213 PR OFFICE/OUTPT VISIT, EST, LEVL III, 20-29 MIN: ICD-10-PCS | Mod: S$GLB,,, | Performed by: PHYSICIAN ASSISTANT

## 2023-09-23 RX ORDER — SUCRALFATE 1 G/1
1 TABLET ORAL 4 TIMES DAILY
Qty: 28 TABLET | Refills: 0 | Status: SHIPPED | OUTPATIENT
Start: 2023-09-23 | End: 2023-09-30

## 2023-09-23 NOTE — PATIENT INSTRUCTIONS

## 2023-09-23 NOTE — PROGRESS NOTES
"Subjective:      Patient ID: Augustine Webb is a 33 y.o. male.    Vitals:  height is 5' 10" (1.778 m) and weight is 74.8 kg (165 lb). His oral temperature is 98.4 °F (36.9 °C). His blood pressure is 122/80 and his pulse is 62. His respiration is 18 and oxygen saturation is 99%.     Chief Complaint: Abdominal Pain    Patient reports shooting intermittently pain under rib cage for the last two days. Patient reports vomiting and diarrhea yesterday. Patient reports taking a pepto tablet this morning with no relief. Patient reports 8/10 pain when it hurts but than resolves quickly.  Patient is currently taking colchicine and ibuprofen 3 times daily for idiopathic pericarditis.  Patient regularly takes these medications on an empty stomach.    Abdominal Pain  This is a new problem. The current episode started yesterday. The onset quality is sudden. The problem occurs intermittently. The problem has been unchanged. The pain is located in the epigastric region. The pain is at a severity of 7/10. The pain is moderate. The quality of the pain is burning, sharp and a sensation of fullness. The abdominal pain does not radiate. Associated symptoms include diarrhea, nausea and vomiting. Pertinent negatives include no constipation or fever. Nothing aggravates the pain. The pain is relieved by Nothing. He has tried antacids for the symptoms. The treatment provided no relief.       Constitution: Negative for chills and fever.   Gastrointestinal:  Positive for abdominal pain, nausea, vomiting and diarrhea. Negative for constipation.      Objective:     Physical Exam   Constitutional: He does not appear ill. No distress.   HENT:   Head: Normocephalic and atraumatic.   Ears:   Right Ear: External ear normal.   Left Ear: External ear normal.   Eyes: Conjunctivae are normal. Right eye exhibits no discharge. Left eye exhibits no discharge. Extraocular movement intact   Cardiovascular: Normal rate, regular rhythm and normal heart sounds. "   No murmur heard.  Pulmonary/Chest: Effort normal. No respiratory distress.   Abdominal: Normal appearance. Soft. There is no abdominal tenderness. There is no rebound and no guarding.   Musculoskeletal: Normal range of motion.         General: Normal range of motion.   Neurological: He is alert.   Skin: Skin is warm, dry and not pale. jaundice  Psychiatric: His behavior is normal. Mood, judgment and thought content normal.   Nursing note and vitals reviewed.      Assessment:     1. Epigastric pain    2. Diarrhea, unspecified type        Plan:       Epigastric pain    Diarrhea, unspecified type    Other orders  -     sucralfate (CARAFATE) 1 gram tablet; Take 1 tablet (1 g total) by mouth 4 (four) times daily. for 7 days  Dispense: 28 tablet; Refill: 0    Discussed viral gastritis versus medication induced gastritis.  Advised to take medications with food.  If symptoms persist follow-up with PCP.  If any worsening of symptoms go to ED.    Patient Instructions   You must understand that you've received an Urgent Care treatment only and that you may be released before all your medical problems are known or treated. You, the patient, will arrange for follow up care as instructed.  Follow up with your PCP or specialty clinic as directed in the next 1-2 weeks if not improved or as needed.  You can call [phone] to schedule an appointment with the appropriate provider.  If your condition worsens we recommend that you receive another evaluation at the emergency room immediately or contact your primary medical clinics after hours call service to discuss your concerns.  Please return here or go to the Emergency Department for any concerns or worsening of condition.

## 2024-05-03 ENCOUNTER — TELEPHONE (OUTPATIENT)
Dept: CARDIOLOGY | Facility: CLINIC | Age: 34
End: 2024-05-03

## 2025-03-08 NOTE — PROGRESS NOTES
Subjective:    Patient ID:  Augustine Webb is a 34 y.o. male who presents for follow-up of Palpitations      Problem List Items Addressed This Visit          Cardiac/Vascular    Heart palpitations - Primary     Other Visit Diagnoses         Encounter for cardiac risk counseling          Chest pain, unspecified type        Relevant Medications    colchicine (MITIGARE) 0.6 mg Cap      Acute idiopathic pericarditis                  Patient was last seen on 08/16/2023 at which time and Holter monitor with continuation of colchicine.  Echocardiogram showed preserved ejection fraction without acute abnormalities.  Monitor showed no significant clinical arrhythmia.    History of Present Illness    CHIEF COMPLAINT:  Mr. Webb presents today for follow up of pericarditis    HISTORY OF PRESENT ILLNESS:  He experienced a pericarditis flare-up two weeks ago with pulsing pain when lying down or bending over. A possible viral trigger was noted, as his wife and daughter had the flu three weeks prior, and his son had COVID the week before. He developed a sore throat and felt sick, which resolved, followed by chest pain onset approximately a week later. He has experienced intermittent, random chest pain since his teenage years.    MEDICATIONS:  He is currently taking colchicine twice daily for 30 days and ibuprofen 800mg (half tablet at night and a whole tablet in the AM). He had not taken any scheduled medications between his pericarditis resolution in 2023 and the current flare. Minor flare-ups respond well to OTC ibuprofen.    Parts of this note were transcribed using voice recognition and generative artificial intelligence software (M*Modal and DeepScribe).  Please excuse any grammatical or syntax errors and reach out to me with any questions or clarifications needed.         Back on colchicine since episode 2 weeks ago  How is pericarditis pain -  better now    Objective:     Vitals:    03/10/25 0905   BP: (!) 141/84   BP  "Location: Left arm   Patient Position: Sitting   Pulse: 76   Weight: 78 kg (171 lb 15.3 oz)   Height: 5' 9" (1.753 m)       BP Readings from Last 5 Encounters:   03/10/25 (!) 141/84   02/26/25 118/64   12/31/24 124/62   06/03/24 130/84   05/31/24 (!) 135/93        Physical Exam  Constitutional:       Appearance: He is well-developed.   HENT:      Head: Normocephalic and atraumatic.   Neck:      Vascular: No JVD.   Cardiovascular:      Rate and Rhythm: Normal rate and regular rhythm.      Heart sounds: Normal heart sounds. No murmur heard.     No friction rub. No gallop.   Pulmonary:      Effort: Pulmonary effort is normal. No respiratory distress.      Breath sounds: Normal breath sounds. No wheezing or rales.   Abdominal:      General: Bowel sounds are normal.      Palpations: Abdomen is soft.      Tenderness: There is no abdominal tenderness. There is no guarding or rebound.   Musculoskeletal:      Cervical back: Normal range of motion and neck supple.   Skin:     General: Skin is warm and dry.   Neurological:      Mental Status: He is alert and oriented to person, place, and time.   Psychiatric:         Behavior: Behavior normal.             Current Outpatient Medications   Medication Instructions    colchicine (MITIGARE) 0.6 mg, Oral, 2 times daily    ibuprofen (ADVIL,MOTRIN) 800 mg, Oral, 2 times daily       Lipid Panel:   Lab Results   Component Value Date    CHOL 207 (H) 08/10/2023    HDL 74 08/10/2023    LDLCALC 119.6 08/10/2023    TRIG 67 08/10/2023    CHOLHDL 35.7 08/10/2023       The ASCVD Risk score (Dilan DK, et al., 2019) failed to calculate for the following reasons:    The 2019 ASCVD risk score is only valid for ages 40 to 79    Most Recent EKG Results  Results for orders placed or performed during the hospital encounter of 02/26/25   EKG 12-lead    Collection Time: 02/26/25  9:46 AM   Result Value Ref Range    QRS Duration 84 ms    [facility] QTC Calculation 419 ms    Narrative    Test Reason : " R07.9,    Vent. Rate :  71 BPM     Atrial Rate :  71 BPM     P-R Int : 156 ms          QRS Dur :  84 ms      QT Int : 386 ms       P-R-T Axes :  33  61  47 degrees    QTcB Int : 419 ms    Normal sinus rhythm  Normal ECG  When compared with ECG of 31-May-2024 13:47,  QT has lengthened  Confirmed by Kieran Elias ([id number]) on 2/26/2025 9:31:45 PM    Referred By: LEXUS REYES           Confirmed By: Kieran Elias       Most Recent Echocardiogram Results  Results for orders placed during the hospital encounter of 08/23/23    Echo Saline Bubble? No    Interpretation Summary    Left Ventricle: The left ventricle is normal in size. Normal wall thickness. Normal wall motion. There is normal systolic function with a visually estimated ejection fraction of 60 - 65%. There is normal diastolic function.    Left Atrium: Left atrium is mildly dilated.    Right Ventricle: Normal right ventricular cavity size. Wall thickness is normal. Right ventricle wall motion  is normal. Systolic function is normal.    IVC/SVC: Normal venous pressure at 3 mmHg.    Pericardium: There is no pericardial effusion.      Most Recent Nuclear Stress Test Results  No results found for this or any previous visit.      Most Recent Cardiac PET Stress Test Results  No results found for this or any previous visit.      Most Recent Cardiovascular Angiogram results  No results found for this or any previous visit.      Other Most Recent Cardiology Results  Results for orders placed during the hospital encounter of 05/31/24    Cardiac monitoring strips        All pertinent data including labs, imaging, EKGs, and studies listed above were reviewed.  Patient's most recent EKG tracing was personally interpreted by this provider.    Diagnoses:       1. Heart palpitations    2. Encounter for cardiac risk counseling    3. Chest pain, unspecified type    4. Acute idiopathic pericarditis         Plan for treatment of the above diagnoses:     Assessment & Plan     Diagnosed viral pericarditis, likely triggered by recent viral illness in family members  Patient's symptoms improved with colchicine, supporting diagnosis  Decided on 3-month course of colchicine to prevent recurrence  Recommend gradual tapering of ibuprofen over a few weeks  Noted patient may be more susceptible to pericarditis with viral infections    PLAN SUMMARY:  Recommend famotidine (Pepcid) for reflux symptoms while taking ibuprofen  Continue ibuprofen 800mg, 1 tablet in morning, 1/2 tablet at night  Decrease ibuprofen over 2 weeks: 1/2 tablet twice daily, then 1/2 tablet in morning only, then discontinue  Start colchicine for 3 months for pericarditis  Contact office if symptoms change or worsen  Follow up in 1 year    PLAN NOTE:  Explained pericarditis as inflammation, often idiopathic or triggered by viral illnesses.  Discussed hydration importance, especially when taking ibuprofen.  Continued ibuprofen 800mg, 1 tablet in morning, 1/2 tablet at night.  Decrease over 2 weeks: reduce to 1/2 tablet twice daily, then 1/2 tablet in morning only, then discontinue.  Started colchicine for 3 months.  Start famotidine (Pepcid) if experiencing reflux symptoms while taking ibuprofen; can use for a few weeks.  Follow up in 1 year.  Contact the office if symptoms change or worsen.         Continue colchicine 0.6mg PO BID to complete 3 months    Continue other cardiac medications  Mediterranean Diet/Cardiovascular Exercise Program    Visit today included increased complexity associated with the care of the episodic problem(s) addressed above in addition to managing the longitudinal care of the patient due to the serious and/or complex managed problem(s) listed above.    Parts of this note were transcribed using voice recognition and generative artificial intelligence software (M*Modal and DeepScribe).  Please excuse any grammatical or syntax errors and reach out to me with any questions or clarifications  needed.    Patient queried and all questions were answered.    F/u in 1 year to reassess      Signed:    Emiliano Rocha MD  3/10/2025 10:23 AM

## 2025-03-10 ENCOUNTER — OFFICE VISIT (OUTPATIENT)
Dept: CARDIOLOGY | Facility: CLINIC | Age: 35
End: 2025-03-10
Payer: COMMERCIAL

## 2025-03-10 VITALS
DIASTOLIC BLOOD PRESSURE: 84 MMHG | BODY MASS INDEX: 25.47 KG/M2 | HEIGHT: 69 IN | WEIGHT: 171.94 LBS | HEART RATE: 76 BPM | SYSTOLIC BLOOD PRESSURE: 141 MMHG

## 2025-03-10 DIAGNOSIS — R07.9 CHEST PAIN, UNSPECIFIED TYPE: ICD-10-CM

## 2025-03-10 DIAGNOSIS — I30.0 ACUTE IDIOPATHIC PERICARDITIS: ICD-10-CM

## 2025-03-10 DIAGNOSIS — R00.2 HEART PALPITATIONS: Primary | ICD-10-CM

## 2025-03-10 DIAGNOSIS — Z71.89 ENCOUNTER FOR CARDIAC RISK COUNSELING: ICD-10-CM

## 2025-03-10 PROCEDURE — 99999 PR PBB SHADOW E&M-EST. PATIENT-LVL III: CPT | Mod: PBBFAC,,, | Performed by: INTERNAL MEDICINE

## 2025-03-10 PROCEDURE — 99214 OFFICE O/P EST MOD 30 MIN: CPT | Mod: S$PBB,,, | Performed by: INTERNAL MEDICINE

## 2025-03-10 PROCEDURE — G2211 COMPLEX E/M VISIT ADD ON: HCPCS | Mod: S$PBB,,, | Performed by: INTERNAL MEDICINE

## 2025-03-10 RX ORDER — COLCHICINE 0.6 MG/1
0.6 CAPSULE ORAL 2 TIMES DAILY
Qty: 180 CAPSULE | Refills: 0 | Status: SHIPPED | OUTPATIENT
Start: 2025-03-10 | End: 2025-06-08

## 2025-06-18 DIAGNOSIS — R07.9 CHEST PAIN, UNSPECIFIED TYPE: ICD-10-CM

## 2025-06-19 RX ORDER — COLCHICINE 0.6 MG/1
0.6 CAPSULE ORAL 2 TIMES DAILY
Qty: 180 CAPSULE | Refills: 0 | Status: SHIPPED | OUTPATIENT
Start: 2025-06-19 | End: 2025-09-17

## (undated) DEVICE — FORCEP STRAIGHT DISP

## (undated) DEVICE — BANDAGE ESMARK LATEX FREE 4INX

## (undated) DEVICE — NDL HYPO A BEVEL 22X1 1/2

## (undated) DEVICE — SEE L#120831

## (undated) DEVICE — DRESSING XEROFORM FOIL PK 1X8

## (undated) DEVICE — SUT ETHILON 4-0 PS2 18 BLK

## (undated) DEVICE — DURAPREP SURG SCRUB 26ML

## (undated) DEVICE — GLOVE BIOGEL PI MICRO SZ 7.5

## (undated) DEVICE — SPONGE DERMACEA 4X4IN 12PLY

## (undated) DEVICE — CORD BIPOLAR 12 FOOT

## (undated) DEVICE — SEE MEDLINE ITEM 152515

## (undated) DEVICE — SEE MEDLINE ITEM 157128

## (undated) DEVICE — UNDERGLOVES BIOGEL PI SIZE 8

## (undated) DEVICE — STOCKINET 4INX48

## (undated) DEVICE — Device

## (undated) DEVICE — SEE MEDLINE ITEM 157131

## (undated) DEVICE — BLADE SCALP OPHTL BEVEL STR

## (undated) DEVICE — SEE MEDLINE ITEM 157173